# Patient Record
Sex: FEMALE | URBAN - METROPOLITAN AREA
[De-identification: names, ages, dates, MRNs, and addresses within clinical notes are randomized per-mention and may not be internally consistent; named-entity substitution may affect disease eponyms.]

---

## 2019-09-07 ENCOUNTER — INPATIENT (INPATIENT)
Facility: HOSPITAL | Age: 59
LOS: 5 days | Discharge: HOME | End: 2019-09-13
Attending: SURGERY | Admitting: SURGERY
Payer: COMMERCIAL

## 2019-09-07 VITALS
DIASTOLIC BLOOD PRESSURE: 130 MMHG | RESPIRATION RATE: 18 BRPM | HEART RATE: 95 BPM | OXYGEN SATURATION: 99 % | SYSTOLIC BLOOD PRESSURE: 171 MMHG | TEMPERATURE: 98 F

## 2019-09-07 DIAGNOSIS — K57.20 DIVERTICULITIS OF LARGE INTESTINE WITH PERFORATION AND ABSCESS WITHOUT BLEEDING: ICD-10-CM

## 2019-09-07 DIAGNOSIS — Z98.890 OTHER SPECIFIED POSTPROCEDURAL STATES: Chronic | ICD-10-CM

## 2019-09-07 PROCEDURE — 74177 CT ABD & PELVIS W/CONTRAST: CPT | Mod: 26

## 2019-09-07 PROCEDURE — 99222 1ST HOSP IP/OBS MODERATE 55: CPT | Mod: GC

## 2019-09-07 RX ORDER — INFLUENZA VIRUS VACCINE 15; 15; 15; 15 UG/.5ML; UG/.5ML; UG/.5ML; UG/.5ML
0.5 SUSPENSION INTRAMUSCULAR ONCE
Refills: 0 | Status: DISCONTINUED | OUTPATIENT
Start: 2019-09-07 | End: 2019-09-13

## 2019-09-07 RX ORDER — HYDROMORPHONE HYDROCHLORIDE 2 MG/ML
1 INJECTION INTRAMUSCULAR; INTRAVENOUS; SUBCUTANEOUS ONCE
Refills: 0 | Status: DISCONTINUED | OUTPATIENT
Start: 2019-09-07 | End: 2019-09-07

## 2019-09-07 RX ORDER — NICOTINE POLACRILEX 2 MG
1 GUM BUCCAL DAILY
Refills: 0 | Status: DISCONTINUED | OUTPATIENT
Start: 2019-09-07 | End: 2019-09-13

## 2019-09-07 RX ORDER — SODIUM CHLORIDE 9 MG/ML
1000 INJECTION, SOLUTION INTRAVENOUS
Refills: 0 | Status: DISCONTINUED | OUTPATIENT
Start: 2019-09-07 | End: 2019-09-07

## 2019-09-07 RX ORDER — ACETAMINOPHEN 500 MG
1000 TABLET ORAL ONCE
Refills: 0 | Status: COMPLETED | OUTPATIENT
Start: 2019-09-08 | End: 2019-09-08

## 2019-09-07 RX ORDER — PIPERACILLIN AND TAZOBACTAM 4; .5 G/20ML; G/20ML
3.38 INJECTION, POWDER, LYOPHILIZED, FOR SOLUTION INTRAVENOUS ONCE
Refills: 0 | Status: COMPLETED | OUTPATIENT
Start: 2019-09-07 | End: 2019-09-07

## 2019-09-07 RX ORDER — LIDOCAINE 4 G/100G
5 CREAM TOPICAL ONCE
Refills: 0 | Status: COMPLETED | OUTPATIENT
Start: 2019-09-07 | End: 2019-09-07

## 2019-09-07 RX ORDER — HYDROMORPHONE HYDROCHLORIDE 2 MG/ML
0.5 INJECTION INTRAMUSCULAR; INTRAVENOUS; SUBCUTANEOUS ONCE
Refills: 0 | Status: DISCONTINUED | OUTPATIENT
Start: 2019-09-07 | End: 2019-09-07

## 2019-09-07 RX ORDER — MORPHINE SULFATE 50 MG/1
4 CAPSULE, EXTENDED RELEASE ORAL ONCE
Refills: 0 | Status: DISCONTINUED | OUTPATIENT
Start: 2019-09-07 | End: 2019-09-07

## 2019-09-07 RX ORDER — ACETAMINOPHEN 500 MG
1000 TABLET ORAL ONCE
Refills: 0 | Status: COMPLETED | OUTPATIENT
Start: 2019-09-07 | End: 2019-09-07

## 2019-09-07 RX ORDER — ENOXAPARIN SODIUM 100 MG/ML
40 INJECTION SUBCUTANEOUS DAILY
Refills: 0 | Status: DISCONTINUED | OUTPATIENT
Start: 2019-09-07 | End: 2019-09-13

## 2019-09-07 RX ORDER — KETOROLAC TROMETHAMINE 30 MG/ML
15 SYRINGE (ML) INJECTION ONCE
Refills: 0 | Status: DISCONTINUED | OUTPATIENT
Start: 2019-09-07 | End: 2019-09-07

## 2019-09-07 RX ORDER — SODIUM CHLORIDE 9 MG/ML
1000 INJECTION INTRAMUSCULAR; INTRAVENOUS; SUBCUTANEOUS
Refills: 0 | Status: DISCONTINUED | OUTPATIENT
Start: 2019-09-07 | End: 2019-09-08

## 2019-09-07 RX ORDER — HYDROMORPHONE HYDROCHLORIDE 2 MG/ML
0.5 INJECTION INTRAMUSCULAR; INTRAVENOUS; SUBCUTANEOUS EVERY 4 HOURS
Refills: 0 | Status: DISCONTINUED | OUTPATIENT
Start: 2019-09-07 | End: 2019-09-09

## 2019-09-07 RX ORDER — PIPERACILLIN AND TAZOBACTAM 4; .5 G/20ML; G/20ML
3.38 INJECTION, POWDER, LYOPHILIZED, FOR SOLUTION INTRAVENOUS EVERY 8 HOURS
Refills: 0 | Status: DISCONTINUED | OUTPATIENT
Start: 2019-09-07 | End: 2019-09-13

## 2019-09-07 RX ORDER — FAMOTIDINE 10 MG/ML
20 INJECTION INTRAVENOUS ONCE
Refills: 0 | Status: COMPLETED | OUTPATIENT
Start: 2019-09-07 | End: 2019-09-07

## 2019-09-07 RX ADMIN — Medication 15 MILLIGRAM(S): at 10:25

## 2019-09-07 RX ADMIN — FAMOTIDINE 20 MILLIGRAM(S): 10 INJECTION INTRAVENOUS at 05:08

## 2019-09-07 RX ADMIN — Medication 400 MILLIGRAM(S): at 17:59

## 2019-09-07 RX ADMIN — MORPHINE SULFATE 4 MILLIGRAM(S): 50 CAPSULE, EXTENDED RELEASE ORAL at 06:51

## 2019-09-07 RX ADMIN — SODIUM CHLORIDE 100 MILLILITER(S): 9 INJECTION, SOLUTION INTRAVENOUS at 13:38

## 2019-09-07 RX ADMIN — HYDROMORPHONE HYDROCHLORIDE 0.5 MILLIGRAM(S): 2 INJECTION INTRAMUSCULAR; INTRAVENOUS; SUBCUTANEOUS at 20:39

## 2019-09-07 RX ADMIN — HYDROMORPHONE HYDROCHLORIDE 0.5 MILLIGRAM(S): 2 INJECTION INTRAMUSCULAR; INTRAVENOUS; SUBCUTANEOUS at 13:39

## 2019-09-07 RX ADMIN — ENOXAPARIN SODIUM 40 MILLIGRAM(S): 100 INJECTION SUBCUTANEOUS at 13:49

## 2019-09-07 RX ADMIN — HYDROMORPHONE HYDROCHLORIDE 1 MILLIGRAM(S): 2 INJECTION INTRAMUSCULAR; INTRAVENOUS; SUBCUTANEOUS at 07:58

## 2019-09-07 RX ADMIN — LIDOCAINE 5 MILLILITER(S): 4 CREAM TOPICAL at 05:07

## 2019-09-07 RX ADMIN — MORPHINE SULFATE 4 MILLIGRAM(S): 50 CAPSULE, EXTENDED RELEASE ORAL at 06:21

## 2019-09-07 RX ADMIN — PIPERACILLIN AND TAZOBACTAM 200 GRAM(S): 4; .5 INJECTION, POWDER, LYOPHILIZED, FOR SOLUTION INTRAVENOUS at 10:38

## 2019-09-07 RX ADMIN — PIPERACILLIN AND TAZOBACTAM 25 GRAM(S): 4; .5 INJECTION, POWDER, LYOPHILIZED, FOR SOLUTION INTRAVENOUS at 17:59

## 2019-09-07 RX ADMIN — Medication 30 MILLILITER(S): at 05:07

## 2019-09-07 RX ADMIN — Medication 1 PATCH: at 21:48

## 2019-09-07 RX ADMIN — Medication 1000 MILLIGRAM(S): at 18:29

## 2019-09-07 RX ADMIN — HYDROMORPHONE HYDROCHLORIDE 0.5 MILLIGRAM(S): 2 INJECTION INTRAMUSCULAR; INTRAVENOUS; SUBCUTANEOUS at 13:00

## 2019-09-07 RX ADMIN — HYDROMORPHONE HYDROCHLORIDE 1 MILLIGRAM(S): 2 INJECTION INTRAMUSCULAR; INTRAVENOUS; SUBCUTANEOUS at 07:28

## 2019-09-07 RX ADMIN — Medication 15 MILLIGRAM(S): at 09:15

## 2019-09-07 RX ADMIN — HYDROMORPHONE HYDROCHLORIDE 0.5 MILLIGRAM(S): 2 INJECTION INTRAMUSCULAR; INTRAVENOUS; SUBCUTANEOUS at 20:55

## 2019-09-07 RX ADMIN — Medication 400 MILLIGRAM(S): at 13:41

## 2019-09-07 RX ADMIN — HYDROMORPHONE HYDROCHLORIDE 0.5 MILLIGRAM(S): 2 INJECTION INTRAMUSCULAR; INTRAVENOUS; SUBCUTANEOUS at 16:36

## 2019-09-07 RX ADMIN — PIPERACILLIN AND TAZOBACTAM 25 GRAM(S): 4; .5 INJECTION, POWDER, LYOPHILIZED, FOR SOLUTION INTRAVENOUS at 22:59

## 2019-09-07 NOTE — ED PROVIDER NOTE - CLINICAL SUMMARY MEDICAL DECISION MAKING FREE TEXT BOX
58 F with hx hernia repair in the past, presenting with abdominal pain. Diarrhea, nausea, and vomiting last week which has since resolved. Will gets labs and CT abdomen and pelvis to further evaluate.

## 2019-09-07 NOTE — ED PROVIDER NOTE - PROGRESS NOTE DETAILS
[place] PGY3: Patient signed out to me by overnight resident. Pending CT abdomen/pelvis results. Received Toradol 15mg IV for pain.

## 2019-09-07 NOTE — ED ADULT NURSE NOTE - CHPI ED NUR SYMPTOMS NEG
no abdominal distension/no blood in stool/no burning urination/no chills/no fever/no hematuria/no nausea

## 2019-09-07 NOTE — H&P ADULT - HISTORY OF PRESENT ILLNESS
57 y/o who denies any pmhx coming in c/o of abdominal pain that started 7 days ago. Pain localized to LLQ. Patient reports that pain has been worsening in the last few days. Denies any episodes of vomiting, however, reports some nausea. Last PO intake was 2 days ago. Denies any previous similar episodes in the past. Patient has never had a colonoscopy. Denies fevers, chills, chest pain, SOB, changes in urinary habits, diarrhea, or constipation.

## 2019-09-07 NOTE — ED PROVIDER NOTE - OBJECTIVE STATEMENT
58F w. hx umbilical hernia repair 10yrs prior who p/w cramping diffuse abdominal pain x7d. Pt states that she has been having intermittent diffuse pain that is worsened by food and movement. When present, rates that pain as a 10/10. Does not recall eating food that may have been spoiled. Does not feel that her abdomen is more distended than usual. Pt has had episodes of abdominal pain in the past, but states that it has never been this bad. Pt denies fevers/chills, N/V, chest pain, SOB, dysuria, diarrhea or constipation. She has tried nexium, aleve, gas x, and peptobismol without any relief, saw her PCP yesterday.

## 2019-09-07 NOTE — H&P ADULT - ASSESSMENT
59 y/o with denies any PMHx presenting with LLQ pain x7 days, CT scan findings consistent with acute diverticulitis with intramural abscess     - admit to [facility]/ Dr. Del Castillo  - NPO   -IVF  - IV abx  - DVT ppx     Discussed with attending Dr. Del Castillo     [facility] [id number]

## 2019-09-07 NOTE — ED PROVIDER NOTE - ATTENDING CONTRIBUTION TO CARE
Seen and examined, states gradual onset of abd pain, +N/V, no fever/chills, no diarrhea, no sick contacts. Pt. with hx of umbilical hernia repair, no recent umbilical pain. Clear lungs, heart reg, abd soft, mild tenderness L mid quad/LLQ, no megan/guarding, no CVAT, no edema, NT calves.

## 2019-09-07 NOTE — ED ADULT NURSE NOTE - OBJECTIVE STATEMENT
Patient received in room 25 accompanied y spouse with complaints of lower abdominal pain, appears uncomfortable, alert and oriented x 4, respirations are even and unlabored, S1, S2 regular, abdomen is soft and nontender, ambulatory, skin is intact, 20 gauge saline lock placed on left AC, blood drawn and sent. Will follow up.

## 2019-09-07 NOTE — H&P ADULT - NSHPPHYSICALEXAM_GEN_ALL_CORE
Physical Exam  T(C): 37  HR: 89 (89 - 95)  BP: 151/93 (151/93 - 171/130)  RR: 18 (18 - 18)  SpO2: 96% (96% - 99%)  Tmax: T(C): , Max: 37 (09-07-19 @ 07:51)    General: well developed, well nourished, NAD  Neuro: alert and oriented, no focal deficits, moves all extremities spontaneously  HEENT: NCAT, EOMI, anicteric, mucosa moist  Respiratory: airway patent, respirations unlabored  CVS: regular rate and rhythm  Abdomen: soft, TTP LLQ, nondistended  Extremities: no edema, sensation and movement grossly intact  Skin: warm, dry, appropriate color

## 2019-09-07 NOTE — ED ADULT TRIAGE NOTE - CHIEF COMPLAINT QUOTE
Pt st" 6 days ago I had diarreah and vomiting with abd cramping the vomiting and diarreah stopped and the cramping as been coming and going but tonight the pain has been constant." Pt crying appears very uncomfortable, denies med hx. Pt st" I went to [facility] yesterday prescribed Nexium not helping....also took aleve, gas x, peptobismol....nothing helping."

## 2019-09-07 NOTE — H&P ADULT - NSHPLABSRESULTS_GEN_ALL_CORE
Labs:                        14.7   20.90 )-----------( 410      ( 07 Sep 2019 04:00 )             43.7       09-07    139  |  95<L>  |  10  ----------------------------<  126<H>  4.5   |  27  |  0.68    Ca    10.4      07 Sep 2019 04:00    TPro  8.5<H>  /  Alb  4.6  /  TBili  0.6  /  DBili  x   /  AST  20  /  ALT  37<H>  /  AlkPhos  164<H>  09-07            Imaging and other studies:  < from: CT Abdomen and Pelvis w/ IV Cont (09.07.19 @ 09:30) >    FINDINGS:    LOWERCHEST: Coronary artery calcifications.    LIVER: A 1.7 cm right hepatic mass suggestion of nodularity may represent   a hemangioma but could be better assessed on ultrasound or MRI.  BILE DUCTS: Normal caliber.  GALLBLADDER: Layering hyperdensity which may represent sludge.  SPLEEN: Within normal limits.  PANCREAS: Within normal limits.  ADRENALS: Within normal limits.  KIDNEYS/URETERS: Right renal subcentimeter hypodense focus, too small to   categorize. No hydronephrosis.    BLADDER: Decompressed.  REPRODUCTIVE ORGANS: Uterus and adnexa within normal limits.    BOWEL: Marked sigmoid colonic wall thickening with marked surrounding   inflammatory changes. There is an intramural abscess measuring 5.6 x 4.0   cm (605:48). No bowel obstruction. Appendix is normal.  PERITONEUM: No pneumoperitoneum. No ascites. Few prominent mesenteric   lymph nodes, largest of which measures 1.4 x 1.0 cm (5:74).  VESSELS: Atherosclerotic changes.  RETROPERITONEUM/LYMPH NODES: No lymphadenopathy.    ABDOMINAL WALL: Within normal limits.  BONES: Within normal limits.    IMPRESSION:     Complicated acute sigmoid colonic diverticulitis with intramural abscess   measuring 5.6 x 4.0 cm.  Right hepatic lesion, possibly hemangioma. Comparison with prior studies   or ultrasound/MRI would be useful for further evaluation.

## 2019-09-08 LAB
ANION GAP SERPL CALC-SCNC: 16 MMO/L — HIGH (ref 7–14)
BUN SERPL-MCNC: 10 MG/DL — SIGNIFICANT CHANGE UP (ref 7–23)
CALCIUM SERPL-MCNC: 9.6 MG/DL — SIGNIFICANT CHANGE UP (ref 8.4–10.5)
CHLORIDE SERPL-SCNC: 97 MMOL/L — LOW (ref 98–107)
CO2 SERPL-SCNC: 26 MMOL/L — SIGNIFICANT CHANGE UP (ref 22–31)
CREAT SERPL-MCNC: 0.63 MG/DL — SIGNIFICANT CHANGE UP (ref 0.5–1.3)
GLUCOSE SERPL-MCNC: 74 MG/DL — SIGNIFICANT CHANGE UP (ref 70–99)
HCT VFR BLD CALC: 42.5 % — SIGNIFICANT CHANGE UP (ref 34.5–45)
HGB BLD-MCNC: 13.9 G/DL — SIGNIFICANT CHANGE UP (ref 11.5–15.5)
MAGNESIUM SERPL-MCNC: 2 MG/DL — SIGNIFICANT CHANGE UP (ref 1.6–2.6)
MCHC RBC-ENTMCNC: 30.7 PG — SIGNIFICANT CHANGE UP (ref 27–34)
MCHC RBC-ENTMCNC: 32.7 % — SIGNIFICANT CHANGE UP (ref 32–36)
MCV RBC AUTO: 93.8 FL — SIGNIFICANT CHANGE UP (ref 80–100)
NRBC # FLD: 0 K/UL — SIGNIFICANT CHANGE UP (ref 0–0)
PHOSPHATE SERPL-MCNC: 3.1 MG/DL — SIGNIFICANT CHANGE UP (ref 2.5–4.5)
PLATELET # BLD AUTO: 422 K/UL — HIGH (ref 150–400)
PMV BLD: 9.4 FL — SIGNIFICANT CHANGE UP (ref 7–13)
POTASSIUM SERPL-MCNC: 3.8 MMOL/L — SIGNIFICANT CHANGE UP (ref 3.5–5.3)
POTASSIUM SERPL-SCNC: 3.8 MMOL/L — SIGNIFICANT CHANGE UP (ref 3.5–5.3)
RBC # BLD: 4.53 M/UL — SIGNIFICANT CHANGE UP (ref 3.8–5.2)
RBC # FLD: 12 % — SIGNIFICANT CHANGE UP (ref 10.3–14.5)
SODIUM SERPL-SCNC: 139 MMOL/L — SIGNIFICANT CHANGE UP (ref 135–145)
SPECIMEN SOURCE: SIGNIFICANT CHANGE UP
WBC # BLD: 19 K/UL — HIGH (ref 3.8–10.5)
WBC # FLD AUTO: 19 K/UL — HIGH (ref 3.8–10.5)

## 2019-09-08 PROCEDURE — 99232 SBSQ HOSP IP/OBS MODERATE 35: CPT

## 2019-09-08 RX ORDER — SODIUM CHLORIDE 9 MG/ML
1000 INJECTION, SOLUTION INTRAVENOUS
Refills: 0 | Status: DISCONTINUED | OUTPATIENT
Start: 2019-09-08 | End: 2019-09-09

## 2019-09-08 RX ORDER — HYDROMORPHONE HYDROCHLORIDE 2 MG/ML
1 INJECTION INTRAMUSCULAR; INTRAVENOUS; SUBCUTANEOUS EVERY 6 HOURS
Refills: 0 | Status: DISCONTINUED | OUTPATIENT
Start: 2019-09-08 | End: 2019-09-09

## 2019-09-08 RX ORDER — ACETAMINOPHEN 500 MG
1000 TABLET ORAL ONCE
Refills: 0 | Status: COMPLETED | OUTPATIENT
Start: 2019-09-08 | End: 2019-09-08

## 2019-09-08 RX ADMIN — Medication 1000 MILLIGRAM(S): at 14:10

## 2019-09-08 RX ADMIN — HYDROMORPHONE HYDROCHLORIDE 0.5 MILLIGRAM(S): 2 INJECTION INTRAMUSCULAR; INTRAVENOUS; SUBCUTANEOUS at 13:30

## 2019-09-08 RX ADMIN — Medication 400 MILLIGRAM(S): at 13:31

## 2019-09-08 RX ADMIN — Medication 1 PATCH: at 13:23

## 2019-09-08 RX ADMIN — Medication 1 PATCH: at 06:26

## 2019-09-08 RX ADMIN — HYDROMORPHONE HYDROCHLORIDE 0.5 MILLIGRAM(S): 2 INJECTION INTRAMUSCULAR; INTRAVENOUS; SUBCUTANEOUS at 03:22

## 2019-09-08 RX ADMIN — HYDROMORPHONE HYDROCHLORIDE 0.5 MILLIGRAM(S): 2 INJECTION INTRAMUSCULAR; INTRAVENOUS; SUBCUTANEOUS at 13:18

## 2019-09-08 RX ADMIN — Medication 1 PATCH: at 18:41

## 2019-09-08 RX ADMIN — Medication 1000 MILLIGRAM(S): at 23:58

## 2019-09-08 RX ADMIN — PIPERACILLIN AND TAZOBACTAM 25 GRAM(S): 4; .5 INJECTION, POWDER, LYOPHILIZED, FOR SOLUTION INTRAVENOUS at 13:24

## 2019-09-08 RX ADMIN — HYDROMORPHONE HYDROCHLORIDE 0.5 MILLIGRAM(S): 2 INJECTION INTRAMUSCULAR; INTRAVENOUS; SUBCUTANEOUS at 07:05

## 2019-09-08 RX ADMIN — HYDROMORPHONE HYDROCHLORIDE 1 MILLIGRAM(S): 2 INJECTION INTRAMUSCULAR; INTRAVENOUS; SUBCUTANEOUS at 10:13

## 2019-09-08 RX ADMIN — HYDROMORPHONE HYDROCHLORIDE 0.5 MILLIGRAM(S): 2 INJECTION INTRAMUSCULAR; INTRAVENOUS; SUBCUTANEOUS at 16:50

## 2019-09-08 RX ADMIN — Medication 1 PATCH: at 12:31

## 2019-09-08 RX ADMIN — SODIUM CHLORIDE 100 MILLILITER(S): 9 INJECTION, SOLUTION INTRAVENOUS at 09:39

## 2019-09-08 RX ADMIN — Medication 1000 MILLIGRAM(S): at 06:55

## 2019-09-08 RX ADMIN — HYDROMORPHONE HYDROCHLORIDE 0.5 MILLIGRAM(S): 2 INJECTION INTRAMUSCULAR; INTRAVENOUS; SUBCUTANEOUS at 03:40

## 2019-09-08 RX ADMIN — Medication 400 MILLIGRAM(S): at 23:28

## 2019-09-08 RX ADMIN — ENOXAPARIN SODIUM 40 MILLIGRAM(S): 100 INJECTION SUBCUTANEOUS at 13:24

## 2019-09-08 RX ADMIN — Medication 400 MILLIGRAM(S): at 06:25

## 2019-09-08 RX ADMIN — HYDROMORPHONE HYDROCHLORIDE 0.5 MILLIGRAM(S): 2 INJECTION INTRAMUSCULAR; INTRAVENOUS; SUBCUTANEOUS at 06:42

## 2019-09-08 RX ADMIN — Medication 1000 MILLIGRAM(S): at 01:13

## 2019-09-08 RX ADMIN — PIPERACILLIN AND TAZOBACTAM 25 GRAM(S): 4; .5 INJECTION, POWDER, LYOPHILIZED, FOR SOLUTION INTRAVENOUS at 21:22

## 2019-09-08 RX ADMIN — HYDROMORPHONE HYDROCHLORIDE 1 MILLIGRAM(S): 2 INJECTION INTRAMUSCULAR; INTRAVENOUS; SUBCUTANEOUS at 18:25

## 2019-09-08 RX ADMIN — Medication 400 MILLIGRAM(S): at 00:46

## 2019-09-08 RX ADMIN — HYDROMORPHONE HYDROCHLORIDE 1 MILLIGRAM(S): 2 INJECTION INTRAMUSCULAR; INTRAVENOUS; SUBCUTANEOUS at 09:39

## 2019-09-08 RX ADMIN — PIPERACILLIN AND TAZOBACTAM 25 GRAM(S): 4; .5 INJECTION, POWDER, LYOPHILIZED, FOR SOLUTION INTRAVENOUS at 06:26

## 2019-09-08 RX ADMIN — HYDROMORPHONE HYDROCHLORIDE 1 MILLIGRAM(S): 2 INJECTION INTRAMUSCULAR; INTRAVENOUS; SUBCUTANEOUS at 18:40

## 2019-09-08 NOTE — PROGRESS NOTE ADULT - ASSESSMENT
59 y/o with denies any PMHx presenting with LLQ pain x7 days, CT scan findings consistent with acute diverticulitis with intramural abscess       Diet: NPO   IVF: D5 0.9nl  IV abx: Zosyn  DVT ppx: Lovenox    [facility]   [id number]

## 2019-09-09 LAB
ANION GAP SERPL CALC-SCNC: 12 MMO/L — SIGNIFICANT CHANGE UP (ref 7–14)
BUN SERPL-MCNC: 7 MG/DL — SIGNIFICANT CHANGE UP (ref 7–23)
CALCIUM SERPL-MCNC: 9.1 MG/DL — SIGNIFICANT CHANGE UP (ref 8.4–10.5)
CHLORIDE SERPL-SCNC: 101 MMOL/L — SIGNIFICANT CHANGE UP (ref 98–107)
CO2 SERPL-SCNC: 27 MMOL/L — SIGNIFICANT CHANGE UP (ref 22–31)
CREAT SERPL-MCNC: 0.63 MG/DL — SIGNIFICANT CHANGE UP (ref 0.5–1.3)
GLUCOSE SERPL-MCNC: 118 MG/DL — HIGH (ref 70–99)
HCT VFR BLD CALC: 38.3 % — SIGNIFICANT CHANGE UP (ref 34.5–45)
HCV AB S/CO SERPL IA: 0.1 S/CO — SIGNIFICANT CHANGE UP (ref 0–0.99)
HCV AB SERPL-IMP: SIGNIFICANT CHANGE UP
HGB BLD-MCNC: 12.7 G/DL — SIGNIFICANT CHANGE UP (ref 11.5–15.5)
MAGNESIUM SERPL-MCNC: 1.8 MG/DL — SIGNIFICANT CHANGE UP (ref 1.6–2.6)
MCHC RBC-ENTMCNC: 30.7 PG — SIGNIFICANT CHANGE UP (ref 27–34)
MCHC RBC-ENTMCNC: 33.2 % — SIGNIFICANT CHANGE UP (ref 32–36)
MCV RBC AUTO: 92.5 FL — SIGNIFICANT CHANGE UP (ref 80–100)
NRBC # FLD: 0 K/UL — SIGNIFICANT CHANGE UP (ref 0–0)
PHOSPHATE SERPL-MCNC: 2.6 MG/DL — SIGNIFICANT CHANGE UP (ref 2.5–4.5)
PLATELET # BLD AUTO: 398 K/UL — SIGNIFICANT CHANGE UP (ref 150–400)
PMV BLD: 9.1 FL — SIGNIFICANT CHANGE UP (ref 7–13)
POTASSIUM SERPL-MCNC: 3.9 MMOL/L — SIGNIFICANT CHANGE UP (ref 3.5–5.3)
POTASSIUM SERPL-SCNC: 3.9 MMOL/L — SIGNIFICANT CHANGE UP (ref 3.5–5.3)
RBC # BLD: 4.14 M/UL — SIGNIFICANT CHANGE UP (ref 3.8–5.2)
RBC # FLD: 12 % — SIGNIFICANT CHANGE UP (ref 10.3–14.5)
SODIUM SERPL-SCNC: 140 MMOL/L — SIGNIFICANT CHANGE UP (ref 135–145)
WBC # BLD: 13.36 K/UL — HIGH (ref 3.8–10.5)
WBC # FLD AUTO: 13.36 K/UL — HIGH (ref 3.8–10.5)

## 2019-09-09 RX ORDER — ACETAMINOPHEN 500 MG
1000 TABLET ORAL ONCE
Refills: 0 | Status: DISCONTINUED | OUTPATIENT
Start: 2019-09-09 | End: 2019-09-10

## 2019-09-09 RX ORDER — HYDROMORPHONE HYDROCHLORIDE 2 MG/ML
0.5 INJECTION INTRAMUSCULAR; INTRAVENOUS; SUBCUTANEOUS
Refills: 0 | Status: DISCONTINUED | OUTPATIENT
Start: 2019-09-09 | End: 2019-09-13

## 2019-09-09 RX ORDER — ACETAMINOPHEN 500 MG
1000 TABLET ORAL ONCE
Refills: 0 | Status: COMPLETED | OUTPATIENT
Start: 2019-09-09 | End: 2019-09-09

## 2019-09-09 RX ORDER — MAGNESIUM SULFATE 500 MG/ML
2 VIAL (ML) INJECTION ONCE
Refills: 0 | Status: COMPLETED | OUTPATIENT
Start: 2019-09-09 | End: 2019-09-09

## 2019-09-09 RX ORDER — DEXTROSE MONOHYDRATE, SODIUM CHLORIDE, AND POTASSIUM CHLORIDE 50; .745; 4.5 G/1000ML; G/1000ML; G/1000ML
1000 INJECTION, SOLUTION INTRAVENOUS
Refills: 0 | Status: DISCONTINUED | OUTPATIENT
Start: 2019-09-09 | End: 2019-09-13

## 2019-09-09 RX ORDER — HYDROMORPHONE HYDROCHLORIDE 2 MG/ML
1 INJECTION INTRAMUSCULAR; INTRAVENOUS; SUBCUTANEOUS
Refills: 0 | Status: DISCONTINUED | OUTPATIENT
Start: 2019-09-09 | End: 2019-09-13

## 2019-09-09 RX ORDER — POTASSIUM PHOSPHATE, MONOBASIC POTASSIUM PHOSPHATE, DIBASIC 236; 224 MG/ML; MG/ML
15 INJECTION, SOLUTION INTRAVENOUS ONCE
Refills: 0 | Status: COMPLETED | OUTPATIENT
Start: 2019-09-09 | End: 2019-09-09

## 2019-09-09 RX ADMIN — HYDROMORPHONE HYDROCHLORIDE 1 MILLIGRAM(S): 2 INJECTION INTRAMUSCULAR; INTRAVENOUS; SUBCUTANEOUS at 01:08

## 2019-09-09 RX ADMIN — Medication 50 GRAM(S): at 09:53

## 2019-09-09 RX ADMIN — PIPERACILLIN AND TAZOBACTAM 25 GRAM(S): 4; .5 INJECTION, POWDER, LYOPHILIZED, FOR SOLUTION INTRAVENOUS at 21:13

## 2019-09-09 RX ADMIN — Medication 1 PATCH: at 13:54

## 2019-09-09 RX ADMIN — HYDROMORPHONE HYDROCHLORIDE 1 MILLIGRAM(S): 2 INJECTION INTRAMUSCULAR; INTRAVENOUS; SUBCUTANEOUS at 21:13

## 2019-09-09 RX ADMIN — PIPERACILLIN AND TAZOBACTAM 25 GRAM(S): 4; .5 INJECTION, POWDER, LYOPHILIZED, FOR SOLUTION INTRAVENOUS at 05:08

## 2019-09-09 RX ADMIN — HYDROMORPHONE HYDROCHLORIDE 1 MILLIGRAM(S): 2 INJECTION INTRAMUSCULAR; INTRAVENOUS; SUBCUTANEOUS at 13:57

## 2019-09-09 RX ADMIN — Medication 1 PATCH: at 06:37

## 2019-09-09 RX ADMIN — HYDROMORPHONE HYDROCHLORIDE 1 MILLIGRAM(S): 2 INJECTION INTRAMUSCULAR; INTRAVENOUS; SUBCUTANEOUS at 06:56

## 2019-09-09 RX ADMIN — SODIUM CHLORIDE 100 MILLILITER(S): 9 INJECTION, SOLUTION INTRAVENOUS at 05:08

## 2019-09-09 RX ADMIN — Medication 1 PATCH: at 13:56

## 2019-09-09 RX ADMIN — HYDROMORPHONE HYDROCHLORIDE 1 MILLIGRAM(S): 2 INJECTION INTRAMUSCULAR; INTRAVENOUS; SUBCUTANEOUS at 00:53

## 2019-09-09 RX ADMIN — Medication 400 MILLIGRAM(S): at 05:28

## 2019-09-09 RX ADMIN — HYDROMORPHONE HYDROCHLORIDE 1 MILLIGRAM(S): 2 INJECTION INTRAMUSCULAR; INTRAVENOUS; SUBCUTANEOUS at 06:41

## 2019-09-09 RX ADMIN — PIPERACILLIN AND TAZOBACTAM 25 GRAM(S): 4; .5 INJECTION, POWDER, LYOPHILIZED, FOR SOLUTION INTRAVENOUS at 14:04

## 2019-09-09 RX ADMIN — HYDROMORPHONE HYDROCHLORIDE 1 MILLIGRAM(S): 2 INJECTION INTRAMUSCULAR; INTRAVENOUS; SUBCUTANEOUS at 09:54

## 2019-09-09 RX ADMIN — POTASSIUM PHOSPHATE, MONOBASIC POTASSIUM PHOSPHATE, DIBASIC 62.5 MILLIMOLE(S): 236; 224 INJECTION, SOLUTION INTRAVENOUS at 09:54

## 2019-09-09 RX ADMIN — HYDROMORPHONE HYDROCHLORIDE 1 MILLIGRAM(S): 2 INJECTION INTRAMUSCULAR; INTRAVENOUS; SUBCUTANEOUS at 10:24

## 2019-09-09 RX ADMIN — HYDROMORPHONE HYDROCHLORIDE 1 MILLIGRAM(S): 2 INJECTION INTRAMUSCULAR; INTRAVENOUS; SUBCUTANEOUS at 14:27

## 2019-09-09 RX ADMIN — HYDROMORPHONE HYDROCHLORIDE 1 MILLIGRAM(S): 2 INJECTION INTRAMUSCULAR; INTRAVENOUS; SUBCUTANEOUS at 18:10

## 2019-09-09 RX ADMIN — HYDROMORPHONE HYDROCHLORIDE 1 MILLIGRAM(S): 2 INJECTION INTRAMUSCULAR; INTRAVENOUS; SUBCUTANEOUS at 18:40

## 2019-09-09 RX ADMIN — HYDROMORPHONE HYDROCHLORIDE 1 MILLIGRAM(S): 2 INJECTION INTRAMUSCULAR; INTRAVENOUS; SUBCUTANEOUS at 21:28

## 2019-09-09 RX ADMIN — Medication 1000 MILLIGRAM(S): at 05:58

## 2019-09-09 RX ADMIN — DEXTROSE MONOHYDRATE, SODIUM CHLORIDE, AND POTASSIUM CHLORIDE 100 MILLILITER(S): 50; .745; 4.5 INJECTION, SOLUTION INTRAVENOUS at 05:31

## 2019-09-09 RX ADMIN — Medication 1 PATCH: at 19:00

## 2019-09-09 NOTE — PROGRESS NOTE ADULT - ASSESSMENT
57 y/o with denies any PMHx presenting with LLQ pain x7 days, CT scan findings consistent with acute diverticulitis with intramural abscess.       Diet: NPO   IVF: D5 0.9nl  IV abx: Zosyn  DVT ppx: Lovenox    [facility]   [id number] 57 y/o with denies any PMHx presenting with LLQ pain x7 days, CT scan findings consistent with acute diverticulitis with intramural abscess.       Diet: NPO   IVF: D5 0.45nl  IV abx: Zosyn  DVT ppx: Lovenox    [facility]   [id number]

## 2019-09-10 LAB
ANION GAP SERPL CALC-SCNC: 11 MMO/L — SIGNIFICANT CHANGE UP (ref 7–14)
APPEARANCE UR: CLEAR — SIGNIFICANT CHANGE UP
BACTERIA # UR AUTO: NEGATIVE — SIGNIFICANT CHANGE UP
BILIRUB UR-MCNC: NEGATIVE — SIGNIFICANT CHANGE UP
BLOOD UR QL VISUAL: NEGATIVE — SIGNIFICANT CHANGE UP
BUN SERPL-MCNC: 3 MG/DL — LOW (ref 7–23)
CALCIUM SERPL-MCNC: 9.2 MG/DL — SIGNIFICANT CHANGE UP (ref 8.4–10.5)
CHLORIDE SERPL-SCNC: 97 MMOL/L — LOW (ref 98–107)
CO2 SERPL-SCNC: 28 MMOL/L — SIGNIFICANT CHANGE UP (ref 22–31)
COLOR SPEC: SIGNIFICANT CHANGE UP
CREAT SERPL-MCNC: 0.59 MG/DL — SIGNIFICANT CHANGE UP (ref 0.5–1.3)
GLUCOSE SERPL-MCNC: 128 MG/DL — HIGH (ref 70–99)
GLUCOSE UR-MCNC: NEGATIVE — SIGNIFICANT CHANGE UP
HCT VFR BLD CALC: 39.2 % — SIGNIFICANT CHANGE UP (ref 34.5–45)
HGB BLD-MCNC: 13 G/DL — SIGNIFICANT CHANGE UP (ref 11.5–15.5)
HYALINE CASTS # UR AUTO: SIGNIFICANT CHANGE UP
KETONES UR-MCNC: NEGATIVE — SIGNIFICANT CHANGE UP
LEUKOCYTE ESTERASE UR-ACNC: SIGNIFICANT CHANGE UP
MAGNESIUM SERPL-MCNC: 2 MG/DL — SIGNIFICANT CHANGE UP (ref 1.6–2.6)
MCHC RBC-ENTMCNC: 30.7 PG — SIGNIFICANT CHANGE UP (ref 27–34)
MCHC RBC-ENTMCNC: 33.2 % — SIGNIFICANT CHANGE UP (ref 32–36)
MCV RBC AUTO: 92.5 FL — SIGNIFICANT CHANGE UP (ref 80–100)
NITRITE UR-MCNC: NEGATIVE — SIGNIFICANT CHANGE UP
NRBC # FLD: 0 K/UL — SIGNIFICANT CHANGE UP (ref 0–0)
PH UR: 7 — SIGNIFICANT CHANGE UP (ref 5–8)
PHOSPHATE SERPL-MCNC: 2.8 MG/DL — SIGNIFICANT CHANGE UP (ref 2.5–4.5)
PLATELET # BLD AUTO: 433 K/UL — HIGH (ref 150–400)
PMV BLD: 9.1 FL — SIGNIFICANT CHANGE UP (ref 7–13)
POTASSIUM SERPL-MCNC: 3.9 MMOL/L — SIGNIFICANT CHANGE UP (ref 3.5–5.3)
POTASSIUM SERPL-SCNC: 3.9 MMOL/L — SIGNIFICANT CHANGE UP (ref 3.5–5.3)
PROT UR-MCNC: NEGATIVE — SIGNIFICANT CHANGE UP
RBC # BLD: 4.24 M/UL — SIGNIFICANT CHANGE UP (ref 3.8–5.2)
RBC # FLD: 12.3 % — SIGNIFICANT CHANGE UP (ref 10.3–14.5)
RBC CASTS # UR COMP ASSIST: HIGH (ref 0–?)
SODIUM SERPL-SCNC: 136 MMOL/L — SIGNIFICANT CHANGE UP (ref 135–145)
SP GR SPEC: 1.01 — SIGNIFICANT CHANGE UP (ref 1–1.04)
SQUAMOUS # UR AUTO: SIGNIFICANT CHANGE UP
UROBILINOGEN FLD QL: NORMAL — SIGNIFICANT CHANGE UP
WBC # BLD: 15.3 K/UL — HIGH (ref 3.8–10.5)
WBC # FLD AUTO: 15.3 K/UL — HIGH (ref 3.8–10.5)
WBC UR QL: HIGH (ref 0–?)

## 2019-09-10 PROCEDURE — 99232 SBSQ HOSP IP/OBS MODERATE 35: CPT | Mod: GC

## 2019-09-10 RX ORDER — ACETAMINOPHEN 500 MG
975 TABLET ORAL EVERY 6 HOURS
Refills: 0 | Status: DISCONTINUED | OUTPATIENT
Start: 2019-09-10 | End: 2019-09-11

## 2019-09-10 RX ADMIN — HYDROMORPHONE HYDROCHLORIDE 1 MILLIGRAM(S): 2 INJECTION INTRAMUSCULAR; INTRAVENOUS; SUBCUTANEOUS at 12:03

## 2019-09-10 RX ADMIN — HYDROMORPHONE HYDROCHLORIDE 1 MILLIGRAM(S): 2 INJECTION INTRAMUSCULAR; INTRAVENOUS; SUBCUTANEOUS at 09:00

## 2019-09-10 RX ADMIN — HYDROMORPHONE HYDROCHLORIDE 1 MILLIGRAM(S): 2 INJECTION INTRAMUSCULAR; INTRAVENOUS; SUBCUTANEOUS at 05:27

## 2019-09-10 RX ADMIN — HYDROMORPHONE HYDROCHLORIDE 1 MILLIGRAM(S): 2 INJECTION INTRAMUSCULAR; INTRAVENOUS; SUBCUTANEOUS at 01:34

## 2019-09-10 RX ADMIN — Medication 1 PATCH: at 12:07

## 2019-09-10 RX ADMIN — PIPERACILLIN AND TAZOBACTAM 25 GRAM(S): 4; .5 INJECTION, POWDER, LYOPHILIZED, FOR SOLUTION INTRAVENOUS at 14:49

## 2019-09-10 RX ADMIN — Medication 1 PATCH: at 07:15

## 2019-09-10 RX ADMIN — HYDROMORPHONE HYDROCHLORIDE 1 MILLIGRAM(S): 2 INJECTION INTRAMUSCULAR; INTRAVENOUS; SUBCUTANEOUS at 01:19

## 2019-09-10 RX ADMIN — HYDROMORPHONE HYDROCHLORIDE 1 MILLIGRAM(S): 2 INJECTION INTRAMUSCULAR; INTRAVENOUS; SUBCUTANEOUS at 23:14

## 2019-09-10 RX ADMIN — HYDROMORPHONE HYDROCHLORIDE 1 MILLIGRAM(S): 2 INJECTION INTRAMUSCULAR; INTRAVENOUS; SUBCUTANEOUS at 05:12

## 2019-09-10 RX ADMIN — HYDROMORPHONE HYDROCHLORIDE 1 MILLIGRAM(S): 2 INJECTION INTRAMUSCULAR; INTRAVENOUS; SUBCUTANEOUS at 15:54

## 2019-09-10 RX ADMIN — PIPERACILLIN AND TAZOBACTAM 25 GRAM(S): 4; .5 INJECTION, POWDER, LYOPHILIZED, FOR SOLUTION INTRAVENOUS at 05:12

## 2019-09-10 RX ADMIN — HYDROMORPHONE HYDROCHLORIDE 1 MILLIGRAM(S): 2 INJECTION INTRAMUSCULAR; INTRAVENOUS; SUBCUTANEOUS at 19:56

## 2019-09-10 RX ADMIN — HYDROMORPHONE HYDROCHLORIDE 1 MILLIGRAM(S): 2 INJECTION INTRAMUSCULAR; INTRAVENOUS; SUBCUTANEOUS at 12:33

## 2019-09-10 RX ADMIN — HYDROMORPHONE HYDROCHLORIDE 1 MILLIGRAM(S): 2 INJECTION INTRAMUSCULAR; INTRAVENOUS; SUBCUTANEOUS at 23:29

## 2019-09-10 RX ADMIN — PIPERACILLIN AND TAZOBACTAM 25 GRAM(S): 4; .5 INJECTION, POWDER, LYOPHILIZED, FOR SOLUTION INTRAVENOUS at 21:03

## 2019-09-10 RX ADMIN — Medication 1 PATCH: at 19:52

## 2019-09-10 RX ADMIN — HYDROMORPHONE HYDROCHLORIDE 1 MILLIGRAM(S): 2 INJECTION INTRAMUSCULAR; INTRAVENOUS; SUBCUTANEOUS at 20:26

## 2019-09-10 RX ADMIN — HYDROMORPHONE HYDROCHLORIDE 1 MILLIGRAM(S): 2 INJECTION INTRAMUSCULAR; INTRAVENOUS; SUBCUTANEOUS at 16:24

## 2019-09-10 RX ADMIN — Medication 1 PATCH: at 12:03

## 2019-09-10 RX ADMIN — HYDROMORPHONE HYDROCHLORIDE 1 MILLIGRAM(S): 2 INJECTION INTRAMUSCULAR; INTRAVENOUS; SUBCUTANEOUS at 08:30

## 2019-09-10 NOTE — PROGRESS NOTE ADULT - ASSESSMENT
59 y/o with denies any PMHx presenting with LLQ pain x7 days, CT scan findings consistent with acute diverticulitis with intramural abscess.  Pain improving with antibioisis both subjectively and on exam.  Will need a rescan to evaluate interval change in abscess.        Diet: NPO   IVF: D5 0.45nl  IV abx: Zosyn  DVT ppx: Lovenox  Pain control: Tylenol and dilaudid as needed    Will plan to rescan tomorrow    [facility]   [id number]

## 2019-09-11 LAB
ANION GAP SERPL CALC-SCNC: 13 MMO/L — SIGNIFICANT CHANGE UP (ref 7–14)
BUN SERPL-MCNC: 3 MG/DL — LOW (ref 7–23)
CALCIUM SERPL-MCNC: 9.4 MG/DL — SIGNIFICANT CHANGE UP (ref 8.4–10.5)
CHLORIDE SERPL-SCNC: 97 MMOL/L — LOW (ref 98–107)
CO2 SERPL-SCNC: 29 MMOL/L — SIGNIFICANT CHANGE UP (ref 22–31)
CREAT SERPL-MCNC: 0.71 MG/DL — SIGNIFICANT CHANGE UP (ref 0.5–1.3)
GLUCOSE SERPL-MCNC: 103 MG/DL — HIGH (ref 70–99)
HCT VFR BLD CALC: 40.5 % — SIGNIFICANT CHANGE UP (ref 34.5–45)
HGB BLD-MCNC: 12.8 G/DL — SIGNIFICANT CHANGE UP (ref 11.5–15.5)
MAGNESIUM SERPL-MCNC: 2 MG/DL — SIGNIFICANT CHANGE UP (ref 1.6–2.6)
MCHC RBC-ENTMCNC: 30.3 PG — SIGNIFICANT CHANGE UP (ref 27–34)
MCHC RBC-ENTMCNC: 31.6 % — LOW (ref 32–36)
MCV RBC AUTO: 95.7 FL — SIGNIFICANT CHANGE UP (ref 80–100)
NRBC # FLD: 0 K/UL — SIGNIFICANT CHANGE UP (ref 0–0)
PHOSPHATE SERPL-MCNC: 3.6 MG/DL — SIGNIFICANT CHANGE UP (ref 2.5–4.5)
PLATELET # BLD AUTO: 449 K/UL — HIGH (ref 150–400)
PMV BLD: 9.2 FL — SIGNIFICANT CHANGE UP (ref 7–13)
POTASSIUM SERPL-MCNC: 4.5 MMOL/L — SIGNIFICANT CHANGE UP (ref 3.5–5.3)
POTASSIUM SERPL-SCNC: 4.5 MMOL/L — SIGNIFICANT CHANGE UP (ref 3.5–5.3)
RBC # BLD: 4.23 M/UL — SIGNIFICANT CHANGE UP (ref 3.8–5.2)
RBC # FLD: 12.2 % — SIGNIFICANT CHANGE UP (ref 10.3–14.5)
SODIUM SERPL-SCNC: 139 MMOL/L — SIGNIFICANT CHANGE UP (ref 135–145)
WBC # BLD: 14.62 K/UL — HIGH (ref 3.8–10.5)
WBC # FLD AUTO: 14.62 K/UL — HIGH (ref 3.8–10.5)

## 2019-09-11 PROCEDURE — 99232 SBSQ HOSP IP/OBS MODERATE 35: CPT | Mod: GC

## 2019-09-11 RX ORDER — ACETAMINOPHEN 500 MG
975 TABLET ORAL EVERY 6 HOURS
Refills: 0 | Status: DISCONTINUED | OUTPATIENT
Start: 2019-09-11 | End: 2019-09-13

## 2019-09-11 RX ADMIN — HYDROMORPHONE HYDROCHLORIDE 0.5 MILLIGRAM(S): 2 INJECTION INTRAMUSCULAR; INTRAVENOUS; SUBCUTANEOUS at 20:08

## 2019-09-11 RX ADMIN — HYDROMORPHONE HYDROCHLORIDE 0.5 MILLIGRAM(S): 2 INJECTION INTRAMUSCULAR; INTRAVENOUS; SUBCUTANEOUS at 12:22

## 2019-09-11 RX ADMIN — PIPERACILLIN AND TAZOBACTAM 25 GRAM(S): 4; .5 INJECTION, POWDER, LYOPHILIZED, FOR SOLUTION INTRAVENOUS at 21:44

## 2019-09-11 RX ADMIN — Medication 1 PATCH: at 12:24

## 2019-09-11 RX ADMIN — HYDROMORPHONE HYDROCHLORIDE 0.5 MILLIGRAM(S): 2 INJECTION INTRAMUSCULAR; INTRAVENOUS; SUBCUTANEOUS at 12:33

## 2019-09-11 RX ADMIN — Medication 975 MILLIGRAM(S): at 15:30

## 2019-09-11 RX ADMIN — HYDROMORPHONE HYDROCHLORIDE 1 MILLIGRAM(S): 2 INJECTION INTRAMUSCULAR; INTRAVENOUS; SUBCUTANEOUS at 06:00

## 2019-09-11 RX ADMIN — HYDROMORPHONE HYDROCHLORIDE 0.5 MILLIGRAM(S): 2 INJECTION INTRAMUSCULAR; INTRAVENOUS; SUBCUTANEOUS at 16:26

## 2019-09-11 RX ADMIN — HYDROMORPHONE HYDROCHLORIDE 1 MILLIGRAM(S): 2 INJECTION INTRAMUSCULAR; INTRAVENOUS; SUBCUTANEOUS at 09:11

## 2019-09-11 RX ADMIN — HYDROMORPHONE HYDROCHLORIDE 1 MILLIGRAM(S): 2 INJECTION INTRAMUSCULAR; INTRAVENOUS; SUBCUTANEOUS at 03:05

## 2019-09-11 RX ADMIN — HYDROMORPHONE HYDROCHLORIDE 0.5 MILLIGRAM(S): 2 INJECTION INTRAMUSCULAR; INTRAVENOUS; SUBCUTANEOUS at 19:38

## 2019-09-11 RX ADMIN — HYDROMORPHONE HYDROCHLORIDE 1 MILLIGRAM(S): 2 INJECTION INTRAMUSCULAR; INTRAVENOUS; SUBCUTANEOUS at 09:30

## 2019-09-11 RX ADMIN — Medication 975 MILLIGRAM(S): at 14:27

## 2019-09-11 RX ADMIN — PIPERACILLIN AND TAZOBACTAM 25 GRAM(S): 4; .5 INJECTION, POWDER, LYOPHILIZED, FOR SOLUTION INTRAVENOUS at 06:22

## 2019-09-11 RX ADMIN — HYDROMORPHONE HYDROCHLORIDE 1 MILLIGRAM(S): 2 INJECTION INTRAMUSCULAR; INTRAVENOUS; SUBCUTANEOUS at 06:15

## 2019-09-11 RX ADMIN — PIPERACILLIN AND TAZOBACTAM 25 GRAM(S): 4; .5 INJECTION, POWDER, LYOPHILIZED, FOR SOLUTION INTRAVENOUS at 14:29

## 2019-09-11 RX ADMIN — HYDROMORPHONE HYDROCHLORIDE 1 MILLIGRAM(S): 2 INJECTION INTRAMUSCULAR; INTRAVENOUS; SUBCUTANEOUS at 02:50

## 2019-09-11 RX ADMIN — HYDROMORPHONE HYDROCHLORIDE 0.5 MILLIGRAM(S): 2 INJECTION INTRAMUSCULAR; INTRAVENOUS; SUBCUTANEOUS at 16:42

## 2019-09-11 RX ADMIN — Medication 1 PATCH: at 06:30

## 2019-09-11 RX ADMIN — Medication 1 PATCH: at 19:17

## 2019-09-11 NOTE — PROGRESS NOTE ADULT - ASSESSMENT
57 y/o with denies any PMHx presenting with LLQ pain x7 days, CT scan findings consistent with acute diverticulitis with intramural abscess.  Pain improving with antibioisis both subjectively and on exam.  Will need a rescan to evaluate interval change in abscess.        Diet: CLD  IVF: D5 0.45nl  IV abx: Zosyn  DVT ppx: Lovenox  Pain control: Tylenol and dilaudid as needed    Will plan to rescan tomorrow (9/12)    [facility]   [id number]

## 2019-09-12 LAB
ANION GAP SERPL CALC-SCNC: 14 MMO/L — SIGNIFICANT CHANGE UP (ref 7–14)
APTT BLD: 33.1 SEC — SIGNIFICANT CHANGE UP (ref 27.5–36.3)
BACTERIA BLD CULT: SIGNIFICANT CHANGE UP
BUN SERPL-MCNC: 4 MG/DL — LOW (ref 7–23)
CALCIUM SERPL-MCNC: 9.7 MG/DL — SIGNIFICANT CHANGE UP (ref 8.4–10.5)
CHLORIDE SERPL-SCNC: 99 MMOL/L — SIGNIFICANT CHANGE UP (ref 98–107)
CO2 SERPL-SCNC: 25 MMOL/L — SIGNIFICANT CHANGE UP (ref 22–31)
CREAT SERPL-MCNC: 0.64 MG/DL — SIGNIFICANT CHANGE UP (ref 0.5–1.3)
GLUCOSE SERPL-MCNC: 109 MG/DL — HIGH (ref 70–99)
HCT VFR BLD CALC: 40.2 % — SIGNIFICANT CHANGE UP (ref 34.5–45)
HGB BLD-MCNC: 13.2 G/DL — SIGNIFICANT CHANGE UP (ref 11.5–15.5)
INR BLD: 1.19 — HIGH (ref 0.88–1.17)
MAGNESIUM SERPL-MCNC: 2 MG/DL — SIGNIFICANT CHANGE UP (ref 1.6–2.6)
MCHC RBC-ENTMCNC: 30.4 PG — SIGNIFICANT CHANGE UP (ref 27–34)
MCHC RBC-ENTMCNC: 32.8 % — SIGNIFICANT CHANGE UP (ref 32–36)
MCV RBC AUTO: 92.6 FL — SIGNIFICANT CHANGE UP (ref 80–100)
NRBC # FLD: 0 K/UL — SIGNIFICANT CHANGE UP (ref 0–0)
PHOSPHATE SERPL-MCNC: 4.1 MG/DL — SIGNIFICANT CHANGE UP (ref 2.5–4.5)
PLATELET # BLD AUTO: 481 K/UL — HIGH (ref 150–400)
PMV BLD: 8.7 FL — SIGNIFICANT CHANGE UP (ref 7–13)
POTASSIUM SERPL-MCNC: 4.2 MMOL/L — SIGNIFICANT CHANGE UP (ref 3.5–5.3)
POTASSIUM SERPL-SCNC: 4.2 MMOL/L — SIGNIFICANT CHANGE UP (ref 3.5–5.3)
PROTHROM AB SERPL-ACNC: 13.3 SEC — HIGH (ref 9.8–13.1)
RBC # BLD: 4.34 M/UL — SIGNIFICANT CHANGE UP (ref 3.8–5.2)
RBC # FLD: 12.1 % — SIGNIFICANT CHANGE UP (ref 10.3–14.5)
SODIUM SERPL-SCNC: 138 MMOL/L — SIGNIFICANT CHANGE UP (ref 135–145)
WBC # BLD: 10.78 K/UL — HIGH (ref 3.8–10.5)
WBC # FLD AUTO: 10.78 K/UL — HIGH (ref 3.8–10.5)

## 2019-09-12 PROCEDURE — 99232 SBSQ HOSP IP/OBS MODERATE 35: CPT | Mod: GC

## 2019-09-12 PROCEDURE — 74177 CT ABD & PELVIS W/CONTRAST: CPT | Mod: 26

## 2019-09-12 RX ORDER — SODIUM CHLORIDE 9 MG/ML
1000 INJECTION, SOLUTION INTRAVENOUS
Refills: 0 | Status: DISCONTINUED | OUTPATIENT
Start: 2019-09-13 | End: 2019-09-13

## 2019-09-12 RX ADMIN — ENOXAPARIN SODIUM 40 MILLIGRAM(S): 100 INJECTION SUBCUTANEOUS at 12:46

## 2019-09-12 RX ADMIN — Medication 1 PATCH: at 20:23

## 2019-09-12 RX ADMIN — HYDROMORPHONE HYDROCHLORIDE 1 MILLIGRAM(S): 2 INJECTION INTRAMUSCULAR; INTRAVENOUS; SUBCUTANEOUS at 18:20

## 2019-09-12 RX ADMIN — HYDROMORPHONE HYDROCHLORIDE 0.5 MILLIGRAM(S): 2 INJECTION INTRAMUSCULAR; INTRAVENOUS; SUBCUTANEOUS at 05:47

## 2019-09-12 RX ADMIN — DEXTROSE MONOHYDRATE, SODIUM CHLORIDE, AND POTASSIUM CHLORIDE 50 MILLILITER(S): 50; .745; 4.5 INJECTION, SOLUTION INTRAVENOUS at 12:47

## 2019-09-12 RX ADMIN — Medication 1 PATCH: at 08:07

## 2019-09-12 RX ADMIN — HYDROMORPHONE HYDROCHLORIDE 1 MILLIGRAM(S): 2 INJECTION INTRAMUSCULAR; INTRAVENOUS; SUBCUTANEOUS at 14:00

## 2019-09-12 RX ADMIN — Medication 1 PATCH: at 12:46

## 2019-09-12 RX ADMIN — PIPERACILLIN AND TAZOBACTAM 25 GRAM(S): 4; .5 INJECTION, POWDER, LYOPHILIZED, FOR SOLUTION INTRAVENOUS at 21:18

## 2019-09-12 RX ADMIN — HYDROMORPHONE HYDROCHLORIDE 0.5 MILLIGRAM(S): 2 INJECTION INTRAMUSCULAR; INTRAVENOUS; SUBCUTANEOUS at 06:17

## 2019-09-12 RX ADMIN — HYDROMORPHONE HYDROCHLORIDE 1 MILLIGRAM(S): 2 INJECTION INTRAMUSCULAR; INTRAVENOUS; SUBCUTANEOUS at 10:13

## 2019-09-12 RX ADMIN — HYDROMORPHONE HYDROCHLORIDE 1 MILLIGRAM(S): 2 INJECTION INTRAMUSCULAR; INTRAVENOUS; SUBCUTANEOUS at 09:57

## 2019-09-12 RX ADMIN — HYDROMORPHONE HYDROCHLORIDE 1 MILLIGRAM(S): 2 INJECTION INTRAMUSCULAR; INTRAVENOUS; SUBCUTANEOUS at 18:06

## 2019-09-12 RX ADMIN — HYDROMORPHONE HYDROCHLORIDE 1 MILLIGRAM(S): 2 INJECTION INTRAMUSCULAR; INTRAVENOUS; SUBCUTANEOUS at 00:38

## 2019-09-12 RX ADMIN — HYDROMORPHONE HYDROCHLORIDE 1 MILLIGRAM(S): 2 INJECTION INTRAMUSCULAR; INTRAVENOUS; SUBCUTANEOUS at 21:18

## 2019-09-12 RX ADMIN — HYDROMORPHONE HYDROCHLORIDE 1 MILLIGRAM(S): 2 INJECTION INTRAMUSCULAR; INTRAVENOUS; SUBCUTANEOUS at 00:09

## 2019-09-12 RX ADMIN — PIPERACILLIN AND TAZOBACTAM 25 GRAM(S): 4; .5 INJECTION, POWDER, LYOPHILIZED, FOR SOLUTION INTRAVENOUS at 05:47

## 2019-09-12 RX ADMIN — PIPERACILLIN AND TAZOBACTAM 25 GRAM(S): 4; .5 INJECTION, POWDER, LYOPHILIZED, FOR SOLUTION INTRAVENOUS at 14:04

## 2019-09-12 RX ADMIN — HYDROMORPHONE HYDROCHLORIDE 1 MILLIGRAM(S): 2 INJECTION INTRAMUSCULAR; INTRAVENOUS; SUBCUTANEOUS at 14:10

## 2019-09-12 RX ADMIN — Medication 1 PATCH: at 12:45

## 2019-09-12 RX ADMIN — HYDROMORPHONE HYDROCHLORIDE 1 MILLIGRAM(S): 2 INJECTION INTRAMUSCULAR; INTRAVENOUS; SUBCUTANEOUS at 21:48

## 2019-09-12 NOTE — PROGRESS NOTE ADULT - ASSESSMENT
59 y/o with denies any PMHx presenting with LLQ pain x7 days, CT scan findings consistent with acute diverticulitis with intramural abscess. Pain improving with antibioisis both subjectively and on exam.  Will need a rescan to evaluate interval change in abscess.      Diet: CLD  IVF: continue IVF  IV abx: Zosyn  DVT ppx: Lovenox  Pain control: Tylenol and dilaudid as needed  Will plan to rescan today     [facility]   [id number]

## 2019-09-13 ENCOUNTER — TRANSCRIPTION ENCOUNTER (OUTPATIENT)
Age: 59
End: 2019-09-13

## 2019-09-13 VITALS
HEART RATE: 73 BPM | OXYGEN SATURATION: 98 % | SYSTOLIC BLOOD PRESSURE: 155 MMHG | RESPIRATION RATE: 19 BRPM | DIASTOLIC BLOOD PRESSURE: 89 MMHG | TEMPERATURE: 98 F

## 2019-09-13 LAB
ANION GAP SERPL CALC-SCNC: 14 MMO/L — SIGNIFICANT CHANGE UP (ref 7–14)
BUN SERPL-MCNC: 3 MG/DL — LOW (ref 7–23)
CALCIUM SERPL-MCNC: 9.5 MG/DL — SIGNIFICANT CHANGE UP (ref 8.4–10.5)
CHLORIDE SERPL-SCNC: 97 MMOL/L — LOW (ref 98–107)
CO2 SERPL-SCNC: 27 MMOL/L — SIGNIFICANT CHANGE UP (ref 22–31)
CREAT SERPL-MCNC: 0.71 MG/DL — SIGNIFICANT CHANGE UP (ref 0.5–1.3)
GLUCOSE SERPL-MCNC: 117 MG/DL — HIGH (ref 70–99)
HCT VFR BLD CALC: 40.2 % — SIGNIFICANT CHANGE UP (ref 34.5–45)
HGB BLD-MCNC: 13.3 G/DL — SIGNIFICANT CHANGE UP (ref 11.5–15.5)
MAGNESIUM SERPL-MCNC: 1.9 MG/DL — SIGNIFICANT CHANGE UP (ref 1.6–2.6)
MCHC RBC-ENTMCNC: 30.5 PG — SIGNIFICANT CHANGE UP (ref 27–34)
MCHC RBC-ENTMCNC: 33.1 % — SIGNIFICANT CHANGE UP (ref 32–36)
MCV RBC AUTO: 92.2 FL — SIGNIFICANT CHANGE UP (ref 80–100)
NRBC # FLD: 0 K/UL — SIGNIFICANT CHANGE UP (ref 0–0)
PHOSPHATE SERPL-MCNC: 4.8 MG/DL — HIGH (ref 2.5–4.5)
PLATELET # BLD AUTO: 532 K/UL — HIGH (ref 150–400)
PMV BLD: 8.8 FL — SIGNIFICANT CHANGE UP (ref 7–13)
POTASSIUM SERPL-MCNC: 4.2 MMOL/L — SIGNIFICANT CHANGE UP (ref 3.5–5.3)
POTASSIUM SERPL-SCNC: 4.2 MMOL/L — SIGNIFICANT CHANGE UP (ref 3.5–5.3)
RBC # BLD: 4.36 M/UL — SIGNIFICANT CHANGE UP (ref 3.8–5.2)
RBC # FLD: 12.3 % — SIGNIFICANT CHANGE UP (ref 10.3–14.5)
SODIUM SERPL-SCNC: 138 MMOL/L — SIGNIFICANT CHANGE UP (ref 135–145)
WBC # BLD: 9.37 K/UL — SIGNIFICANT CHANGE UP (ref 3.8–10.5)
WBC # FLD AUTO: 9.37 K/UL — SIGNIFICANT CHANGE UP (ref 3.8–10.5)

## 2019-09-13 PROCEDURE — 99238 HOSP IP/OBS DSCHRG MGMT 30/<: CPT

## 2019-09-13 RX ORDER — OXYCODONE HYDROCHLORIDE 5 MG/1
1 TABLET ORAL
Qty: 8 | Refills: 0
Start: 2019-09-13 | End: 2019-09-14

## 2019-09-13 RX ORDER — ACETAMINOPHEN 500 MG
3 TABLET ORAL
Qty: 0 | Refills: 0 | DISCHARGE
Start: 2019-09-13

## 2019-09-13 RX ORDER — OXYCODONE HYDROCHLORIDE 5 MG/1
5 TABLET ORAL ONCE
Refills: 0 | Status: DISCONTINUED | OUTPATIENT
Start: 2019-09-13 | End: 2019-09-13

## 2019-09-13 RX ADMIN — ENOXAPARIN SODIUM 40 MILLIGRAM(S): 100 INJECTION SUBCUTANEOUS at 12:17

## 2019-09-13 RX ADMIN — Medication 1 PATCH: at 10:16

## 2019-09-13 RX ADMIN — Medication 975 MILLIGRAM(S): at 10:13

## 2019-09-13 RX ADMIN — HYDROMORPHONE HYDROCHLORIDE 1 MILLIGRAM(S): 2 INJECTION INTRAMUSCULAR; INTRAVENOUS; SUBCUTANEOUS at 05:17

## 2019-09-13 RX ADMIN — HYDROMORPHONE HYDROCHLORIDE 1 MILLIGRAM(S): 2 INJECTION INTRAMUSCULAR; INTRAVENOUS; SUBCUTANEOUS at 01:52

## 2019-09-13 RX ADMIN — PIPERACILLIN AND TAZOBACTAM 25 GRAM(S): 4; .5 INJECTION, POWDER, LYOPHILIZED, FOR SOLUTION INTRAVENOUS at 05:18

## 2019-09-13 RX ADMIN — OXYCODONE HYDROCHLORIDE 5 MILLIGRAM(S): 5 TABLET ORAL at 12:13

## 2019-09-13 RX ADMIN — PIPERACILLIN AND TAZOBACTAM 25 GRAM(S): 4; .5 INJECTION, POWDER, LYOPHILIZED, FOR SOLUTION INTRAVENOUS at 13:30

## 2019-09-13 RX ADMIN — OXYCODONE HYDROCHLORIDE 5 MILLIGRAM(S): 5 TABLET ORAL at 12:45

## 2019-09-13 RX ADMIN — Medication 975 MILLIGRAM(S): at 10:35

## 2019-09-13 RX ADMIN — Medication 1 PATCH: at 12:16

## 2019-09-13 RX ADMIN — HYDROMORPHONE HYDROCHLORIDE 1 MILLIGRAM(S): 2 INJECTION INTRAMUSCULAR; INTRAVENOUS; SUBCUTANEOUS at 05:47

## 2019-09-13 RX ADMIN — HYDROMORPHONE HYDROCHLORIDE 1 MILLIGRAM(S): 2 INJECTION INTRAMUSCULAR; INTRAVENOUS; SUBCUTANEOUS at 02:22

## 2019-09-13 NOTE — PROGRESS NOTE ADULT - SUBJECTIVE AND OBJECTIVE BOX
Interval Events:    No acute events overnight    S: Patient doing well, denies fevers, chills, nausea, emesis, chest pain, SOB.  C/o of some breakthrough pain through tylenol and dilaudid when dilaudid dose is ending. Tolerating PO w/o N/V.  +/+ F/BM.    O: Vital Signs Last 24 Hrs  T(C): 37 (09 Sep 2019 05:07), Max: 37 (09 Sep 2019 05:07)  T(F): 98.6 (09 Sep 2019 05:07), Max: 98.6 (09 Sep 2019 05:07)  HR: 75 (09 Sep 2019 06:41) (70 - 87)  BP: 134/92 (09 Sep 2019 06:41) (130/81 - 150/82)  BP(mean): --  RR: 18 (09 Sep 2019 05:07) (17 - 19)  SpO2: 99% (09 Sep 2019 05:07) (97% - 99%)      07 Sep 2019 07:01  -  08 Sep 2019 07:00  --------------------------------------------------------  IN:    IV PiggyBack: 200 mL    lactated ringers.: 100 mL    sodium chloride 0.9%: 800 mL  Total IN: 1100 mL    OUT:    Voided: 850 mL  Total OUT: 850 mL    Total NET: 250 mL      08 Sep 2019 07:01  -  09 Sep 2019 06:54  --------------------------------------------------------  IN:    dextrose 5% + sodium chloride 0.9%: 1200 mL    IV PiggyBack: 300 mL  Total IN: 1500 mL    OUT:    Voided: 450 mL  Total OUT: 450 mL    Total NET: 1050 mL          Physical Exam:    Gen: Well-developed, well-nourished in no acute distress  Resp: Clear to auscultation bilaterally with no wheezes, rale, or rhonchi  CV: Regular rate and rhythm with no murmur, gallop, or rub  GI: Soft, non-distended with normoactive bowel sounds.  No masses.  Tender to palpation in LLQ  MSK: Moves all extremities equally  Skin: No rashes    Labs:                        13.9   19.00 )-----------( 422      ( 08 Sep 2019 07:33 )             42.5     08 Sep 2019 07:33    139    |  97     |  10     ----------------------------<  74     3.8     |  26     |  0.63     Ca    9.6        08 Sep 2019 07:33  Phos  3.1       08 Sep 2019 07:33  Mg     2.0       08 Sep 2019 07:33        CAPILLARY BLOOD GLUCOSE    Culture - Blood (collected 07 Sep 2019 11:28)  Source: BLOOD VENOUS  Preliminary Report (08 Sep 2019 11:28):    NO ORGANISMS ISOLATED    NO ORGANISMS ISOLATED AT 24 HOURS    Culture - Blood (collected 07 Sep 2019 11:28)  Source: BLOOD PERIPHERAL  Preliminary Report (08 Sep 2019 11:28):    NO ORGANISMS ISOLATED    NO ORGANISMS ISOLATED AT 24 HOURS          MEDICATIONS  (STANDING):  dextrose 5% + sodium chloride 0.45% with potassium chloride 20 mEq/L 1000 milliLiter(s) (100 mL/Hr) IV Continuous <Continuous>  enoxaparin Injectable 40 milliGRAM(s) SubCutaneous daily  influenza   Vaccine 0.5 milliLiter(s) IntraMuscular once  nicotine -  14 mG/24Hr(s) Patch 1 patch Transdermal daily  piperacillin/tazobactam IVPB.. 3.375 Gram(s) IV Intermittent every 8 hours    MEDICATIONS  (PRN):  HYDROmorphone  Injectable 0.5 milliGRAM(s) IV Push every 3 hours PRN Moderate Pain (4 - 6)  HYDROmorphone  Injectable 1 milliGRAM(s) IV Push every 3 hours PRN Severe Pain (7 - 10)
Still having pain. Distended tender in  lower abdomen. To continue NPO and antibiotics. Dheld
A TEAM SURGERY GENERAL SURGERY PROGRESS NOTE:    Interval Events:    No acute events overnight    S: Patient doing well. Minimal pain. Denies fevers, chills, nausea, emesis, chest pain, SOB.   +/+ F/BM.    O:    Vital Signs Last 24 Hrs  T(C): 36.8 (19 @ 05:45), Max: 36.8 (19 @ 10:16)  HR: 72 (19 @ 06:00) (64 - 89)  BP: 151/95 (19 @ 06:00) (123/82 - 151/95)  ABP: --  ABP(mean): --  RR: 16 (19 @ 05:45) (16 - 18)  SpO2: 98% (19 @ 05:45) (96% - 99%)  Wt(kg): --  CVP(mm Hg): --       @ 07:01  -   @ 07:00  --------------------------------------------------------  IN:    dextrose 5% + sodium chloride 0.45% with potassium chloride 20 mEq/L: 600 mL    IV PiggyBack: 100 mL    Oral Fluid: 180 mL  Total IN: 880 mL    OUT:  Total OUT: 0 mL    Total NET: 880 mL      Physical Exam:   Gen: NAD. Alert and cooperative.   Resp: No addition work of breathing.  Card: RRR. No peripheral edema or pallor.   Abd: Soft, ND. suprapubic tenderness. No rebound or guarding   Ext: WWP. No significant deformity.       LABS:    CBC ( @ 06:50)                              13.3                           9.37    )----------------(  532<H>     --    % Neutrophils, --    % Lymphocytes, ANC: --                                  40.2    CBC ( @ 06:45)                              13.2                           10.78<H>  )----------------(  481<H>     --    % Neutrophils, --    % Lymphocytes, ANC: --                                  40.2      BMP ( @ 06:50)             138     |  97<L>   |  3<L>  		Ca++ --      Ca 9.5                ---------------------------------( 117<H>		Mg 1.9                4.2     |  27      |  0.71  			Ph 4.8<H>  BMP ( @ 06:30)             138     |  99      |  4<L>  		Ca++ --      Ca 9.7                ---------------------------------( 109<H>		Mg 2.0                4.2     |  25      |  0.64  			Ph 4.1         Coags ( @ 06:45)  aPTT 33.1 / INR 1.19<H> / PT 13.3<H>      CAPILLARY BLOOD GLUCOSE    Culture - Blood (collected 07 Sep 2019 11:28)  Source: BLOOD VENOUS  Preliminary Report (09 Sep 2019 11:28):    NO ORGANISMS ISOLATED    NO ORGANISMS ISOLATED AT 48 HRS.    Culture - Blood (collected 07 Sep 2019 11:28)  Source: BLOOD PERIPHERAL  Preliminary Report (09 Sep 2019 11:28):    NO ORGANISMS ISOLATED    NO ORGANISMS ISOLATED AT 48 HRS.      Urinalysis Basic - ( 10 Sep 2019 00:05 )    Color: LIGHT YELLOW / Appearance: CLEAR / S.008 / pH: 7.0  Gluc: NEGATIVE / Ketone: NEGATIVE  / Bili: NEGATIVE / Urobili: NORMAL   Blood: NEGATIVE / Protein: NEGATIVE / Nitrite: NEGATIVE   Leuk Esterase: MODERATE / RBC: 6-10 / WBC 6-10   Sq Epi: FEW / Non Sq Epi: x / Bacteria: NEGATIVE        MEDICATIONS  (STANDING):  acetaminophen  IVPB .. 1000 milliGRAM(s) IV Intermittent once  dextrose 5% + sodium chloride 0.45% with potassium chloride 20 mEq/L 1000 milliLiter(s) (100 mL/Hr) IV Continuous <Continuous>  enoxaparin Injectable 40 milliGRAM(s) SubCutaneous daily  influenza   Vaccine 0.5 milliLiter(s) IntraMuscular once  nicotine -  14 mG/24Hr(s) Patch 1 patch Transdermal daily  piperacillin/tazobactam IVPB.. 3.375 Gram(s) IV Intermittent every 8 hours    MEDICATIONS  (PRN):  HYDROmorphone  Injectable 0.5 milliGRAM(s) IV Push every 3 hours PRN Moderate Pain (4 - 6)  HYDROmorphone  Injectable 1 milliGRAM(s) IV Push every 3 hours PRN Severe Pain (7 - 10)
Feels better, Exam negative. WBC down to 10. To repeat CT and likely advance diet. Dheld
Interval Events:    No acute events overnight    S: Patient doing well, denies fevers, chills, nausea, emesis, chest pain, SOB.  Pain is well controlled and improving per patient.  +/- F/BM.    O: Vital Signs Last 24 Hrs  T(C): 37 (10 Sep 2019 05:11), Max: 37 (10 Sep 2019 05:11)  T(F): 98.6 (10 Sep 2019 05:11), Max: 98.6 (10 Sep 2019 05:11)  HR: 80 (10 Sep 2019 08:29) (76 - 93)  BP: 158/98 (10 Sep 2019 08:29) (131/88 - 158/98)  BP(mean): --  RR: 20 (10 Sep 2019 05:11) (17 - 20)  SpO2: 96% (10 Sep 2019 05:11) (96% - 99%)      09 Sep 2019 07:01  -  10 Sep 2019 07:00  --------------------------------------------------------  IN:    dextrose 5% + sodium chloride 0.45% with potassium chloride 20 mEq/L: 2400 mL    IV PiggyBack: 200 mL  Total IN: 2600 mL    OUT:    Voided: 1220 mL  Total OUT: 1220 mL    Total NET: 1380 mL          Physical Exam:    Gen: Well-developed, well-nourished in no acute distress  Resp: Clear to auscultation bilaterally with no wheezes, rale, or rhonchi  CV: Regular rate and rhythm with no murmur, gallop, or rub  1.	GI: Soft, non-distended with normoactive bowel sounds.  No masses.  Mildly tender to palpation, over LLQ.  MSK: Moves all extremities equally  Skin: No rashes    Labs:                        13.0   15.30 )-----------( 433      ( 10 Sep 2019 06:05 )             39.2     10 Sep 2019 06:05    136    |  97     |  3      ----------------------------<  128    3.9     |  28     |  0.59     Ca    9.2        10 Sep 2019 06:05  Phos  2.8       10 Sep 2019 06:05  Mg     2.0       10 Sep 2019 06:05        CAPILLARY BLOOD GLUCOSE                Culture - Blood (collected 07 Sep 2019 11:28)  Source: BLOOD VENOUS  Preliminary Report (09 Sep 2019 11:28):    NO ORGANISMS ISOLATED    NO ORGANISMS ISOLATED AT 48 HRS.    Culture - Blood (collected 07 Sep 2019 11:28)  Source: BLOOD PERIPHERAL  Preliminary Report (09 Sep 2019 11:28):    NO ORGANISMS ISOLATED    NO ORGANISMS ISOLATED AT 48 HRS.      Urinalysis Basic - ( 10 Sep 2019 00:05 )    Color: LIGHT YELLOW / Appearance: CLEAR / S.008 / pH: 7.0  Gluc: NEGATIVE / Ketone: NEGATIVE  / Bili: NEGATIVE / Urobili: NORMAL   Blood: NEGATIVE / Protein: NEGATIVE / Nitrite: NEGATIVE   Leuk Esterase: MODERATE / RBC: 6-10 / WBC 6-10   Sq Epi: FEW / Non Sq Epi: x / Bacteria: NEGATIVE        MEDICATIONS  (STANDING):  acetaminophen  IVPB .. 1000 milliGRAM(s) IV Intermittent once  dextrose 5% + sodium chloride 0.45% with potassium chloride 20 mEq/L 1000 milliLiter(s) (100 mL/Hr) IV Continuous <Continuous>  enoxaparin Injectable 40 milliGRAM(s) SubCutaneous daily  influenza   Vaccine 0.5 milliLiter(s) IntraMuscular once  nicotine -  14 mG/24Hr(s) Patch 1 patch Transdermal daily  piperacillin/tazobactam IVPB.. 3.375 Gram(s) IV Intermittent every 8 hours    MEDICATIONS  (PRN):  HYDROmorphone  Injectable 0.5 milliGRAM(s) IV Push every 3 hours PRN Moderate Pain (4 - 6)  HYDROmorphone  Injectable 1 milliGRAM(s) IV Push every 3 hours PRN Severe Pain (7 - 10)
Interval Events:    No acute events overnight    S: Patient doing well, denies fevers, chills, nausea, emesis, chest pain, SOB.  Pain is worse than yesterday.  +/- F/BM.    O:  Physical Exam:   Gen: NAD. Alert and cooperative.   Resp: No addition work of breathing.  Card: RRR. No peripheral edema or pallor.   Abd: Soft, ND. Tender in along left abdomen. No rebound or guarding   Ext: WWP. No significant deformity.     Vital Signs Last 24 Hrs  T(C): 37 (10 Sep 2019 05:11), Max: 37 (10 Sep 2019 05:11)  T(F): 98.6 (10 Sep 2019 05:11), Max: 98.6 (10 Sep 2019 05:11)  HR: 80 (10 Sep 2019 08:29) (76 - 93)  BP: 158/98 (10 Sep 2019 08:29) (131/88 - 158/98)  BP(mean): --  RR: 20 (10 Sep 2019 05:11) (17 - 20)  SpO2: 96% (10 Sep 2019 05:11) (96% - 99%)      09 Sep 2019 07:01  -  10 Sep 2019 07:00  --------------------------------------------------------  IN:    dextrose 5% + sodium chloride 0.45% with potassium chloride 20 mEq/L: 2400 mL    IV PiggyBack: 200 mL  Total IN: 2600 mL    OUT:    Voided: 1220 mL  Total OUT: 1220 mL    Total NET: 1380 mL    Labs:             13.0   15.30 )-----------( 433      ( 10 Sep 2019 06:05 )             39.2     10 Sep 2019 06:05    136    |  97     |  3      ----------------------------<  128    3.9     |  28     |  0.59     Ca    9.2        10 Sep 2019 06:05  Phos  2.8       10 Sep 2019 06:05  Mg     2.0       10 Sep 2019 06:05        CAPILLARY BLOOD GLUCOSE                Culture - Blood (collected 07 Sep 2019 11:28)  Source: BLOOD VENOUS  Preliminary Report (09 Sep 2019 11:28):    NO ORGANISMS ISOLATED    NO ORGANISMS ISOLATED AT 48 HRS.    Culture - Blood (collected 07 Sep 2019 11:28)  Source: BLOOD PERIPHERAL  Preliminary Report (09 Sep 2019 11:28):    NO ORGANISMS ISOLATED    NO ORGANISMS ISOLATED AT 48 HRS.      Urinalysis Basic - ( 10 Sep 2019 00:05 )    Color: LIGHT YELLOW / Appearance: CLEAR / S.008 / pH: 7.0  Gluc: NEGATIVE / Ketone: NEGATIVE  / Bili: NEGATIVE / Urobili: NORMAL   Blood: NEGATIVE / Protein: NEGATIVE / Nitrite: NEGATIVE   Leuk Esterase: MODERATE / RBC: 6-10 / WBC 6-10   Sq Epi: FEW / Non Sq Epi: x / Bacteria: NEGATIVE        MEDICATIONS  (STANDING):  acetaminophen  IVPB .. 1000 milliGRAM(s) IV Intermittent once  dextrose 5% + sodium chloride 0.45% with potassium chloride 20 mEq/L 1000 milliLiter(s) (100 mL/Hr) IV Continuous <Continuous>  enoxaparin Injectable 40 milliGRAM(s) SubCutaneous daily  influenza   Vaccine 0.5 milliLiter(s) IntraMuscular once  nicotine -  14 mG/24Hr(s) Patch 1 patch Transdermal daily  piperacillin/tazobactam IVPB.. 3.375 Gram(s) IV Intermittent every 8 hours    MEDICATIONS  (PRN):  HYDROmorphone  Injectable 0.5 milliGRAM(s) IV Push every 3 hours PRN Moderate Pain (4 - 6)  HYDROmorphone  Injectable 1 milliGRAM(s) IV Push every 3 hours PRN Severe Pain (7 - 10)
Interval Events:    No acute events overnight    S: Patient doing well. Minimal pain. Denies fevers, chills, nausea, emesis, chest pain, SOB.   +/- F/BM.    O:  Physical Exam:   Gen: NAD. Alert and cooperative.   Resp: No addition work of breathing.  Card: RRR. No peripheral edema or pallor.   Abd: Soft, ND. suprapubic tenderness. No rebound or guarding   Ext: WWP. No significant deformity.     Vital Signs Last 24 Hrs  T(C): 36.8 (19 @ 05:45), Max: 36.8 (19 @ 10:16)  HR: 72 (19 @ 06:00) (64 - 89)  BP: 151/95 (19 @ 06:00) (123/82 - 151/95)  ABP: --  ABP(mean): --  RR: 16 (19 @ 05:45) (16 - 18)  SpO2: 98% (19 @ 05:45) (96% - 99%)  Wt(kg): --  CVP(mm Hg): --       @ 07:01  -   @ 07:00  --------------------------------------------------------  IN:    dextrose 5% + sodium chloride 0.45% with potassium chloride 20 mEq/L: 600 mL    IV PiggyBack: 100 mL    Oral Fluid: 180 mL  Total IN: 880 mL    OUT:  Total OUT: 0 mL    Total NET: 880 mL        CAPILLARY BLOOD GLUCOSE    Culture - Blood (collected 07 Sep 2019 11:28)  Source: BLOOD VENOUS  Preliminary Report (09 Sep 2019 11:28):    NO ORGANISMS ISOLATED    NO ORGANISMS ISOLATED AT 48 HRS.    Culture - Blood (collected 07 Sep 2019 11:28)  Source: BLOOD PERIPHERAL  Preliminary Report (09 Sep 2019 11:28):    NO ORGANISMS ISOLATED    NO ORGANISMS ISOLATED AT 48 HRS.      Urinalysis Basic - ( 10 Sep 2019 00:05 )    Color: LIGHT YELLOW / Appearance: CLEAR / S.008 / pH: 7.0  Gluc: NEGATIVE / Ketone: NEGATIVE  / Bili: NEGATIVE / Urobili: NORMAL   Blood: NEGATIVE / Protein: NEGATIVE / Nitrite: NEGATIVE   Leuk Esterase: MODERATE / RBC: 6-10 / WBC 6-10   Sq Epi: FEW / Non Sq Epi: x / Bacteria: NEGATIVE        MEDICATIONS  (STANDING):  acetaminophen  IVPB .. 1000 milliGRAM(s) IV Intermittent once  dextrose 5% + sodium chloride 0.45% with potassium chloride 20 mEq/L 1000 milliLiter(s) (100 mL/Hr) IV Continuous <Continuous>  enoxaparin Injectable 40 milliGRAM(s) SubCutaneous daily  influenza   Vaccine 0.5 milliLiter(s) IntraMuscular once  nicotine -  14 mG/24Hr(s) Patch 1 patch Transdermal daily  piperacillin/tazobactam IVPB.. 3.375 Gram(s) IV Intermittent every 8 hours    MEDICATIONS  (PRN):  HYDROmorphone  Injectable 0.5 milliGRAM(s) IV Push every 3 hours PRN Moderate Pain (4 - 6)  HYDROmorphone  Injectable 1 milliGRAM(s) IV Push every 3 hours PRN Severe Pain (7 - 10)
VSS, WBC 15,000, Still has lower abdominal pain. To repeat CT 9/12. Claudette
GENERAL SURGERY DAILY PROGRESS NOTE:    Interval: No acute events overnight.    S: Patient seen and examined. Reports pain comes in waves. Denies fever, chills, SOB. - N/V.  +/+ F/BM. +OOB.    O:   Exam:  Gen: NAD. Well developed, alert and cooperative.   Resp: No addition work of breathing.   Card: RR. No peripheral edema or pallor.   Abd: No masses. Soft, ND.  No rebound or guarding. Diffusely tender.   Ext: WWP. Able to move all extremities equally.  Neuro: AA&Ox3. No focal defects.    Vital Signs Last 24 Hrs  T(C): 36.6 (08 Sep 2019 09:44), Max: 37.1 (08 Sep 2019 02:25)  T(F): 97.9 (08 Sep 2019 09:44), Max: 98.7 (08 Sep 2019 02:25)  HR: 78 (08 Sep 2019 09:44) (70 - 95)  BP: 135/95 (08 Sep 2019 09:44) (128/85 - 147/91)  BP(mean): --  RR: 18 (08 Sep 2019 09:44) (16 - 19)  SpO2: 97% (08 Sep 2019 09:44) (95% - 98%)    I&O's Detail    07 Sep 2019 07:01  -  08 Sep 2019 07:00  --------------------------------------------------------  IN:    IV PiggyBack: 200 mL    lactated ringers.: 100 mL    sodium chloride 0.9%: 800 mL  Total IN: 1100 mL    OUT:    Voided: 850 mL  Total OUT: 850 mL    Total NET: 250 mL          Daily     Daily     MEDICATIONS  (STANDING):  dextrose 5% + sodium chloride 0.9%. 1000 milliLiter(s) (100 mL/Hr) IV Continuous <Continuous>  enoxaparin Injectable 40 milliGRAM(s) SubCutaneous daily  influenza   Vaccine 0.5 milliLiter(s) IntraMuscular once  nicotine -  14 mG/24Hr(s) Patch 1 patch Transdermal daily  piperacillin/tazobactam IVPB.. 3.375 Gram(s) IV Intermittent every 8 hours    MEDICATIONS  (PRN):  HYDROmorphone  Injectable 0.5 milliGRAM(s) IV Push every 4 hours PRN Moderate Pain (4 - 6)  HYDROmorphone  Injectable 1 milliGRAM(s) IV Push every 6 hours PRN Severe Pain (7 - 10)      LABS:                        13.9   19.00 )-----------( 422      ( 08 Sep 2019 07:33 )             42.5     09-08    139  |  97<L>  |  10  ----------------------------<  74  3.8   |  26  |  0.63    Ca    9.6      08 Sep 2019 07:33  Phos  3.1     09-08  Mg     2.0     09-08    TPro  8.5<H>  /  Alb  4.6  /  TBili  0.6  /  DBili  x   /  AST  20  /  ALT  37<H>  /  AlkPhos  164<H>  09-07

## 2019-09-13 NOTE — DISCHARGE NOTE NURSING/CASE MANAGEMENT/SOCIAL WORK - NSDCPNINST_GEN_ALL_CORE
If you experience any nausea, vomiting or diarrhea, a temperature of 100.4 or higher- chills or sweating, redness, bleeding, pain that is not controlled with medication prescribed by MD- report symptoms to the MD. Continue your low fiber diet and drink 8 glasses of water daily to promote hydration. If you experience any nausea, vomiting or diarrhea, a temperature of 100.4 or higher- chills or sweating, pain that is not controlled with medication prescribed by MD- report symptoms to the MD. No driving while taking pain medication, it causes drowsiness & constipation. Continue your low fiber diet and drink 8 glasses of water daily to promote hydration. No heavy lifting, pulling or pushing heavy objects. Schedule follow up apt. with MD within a week.

## 2019-09-13 NOTE — PROGRESS NOTE ADULT - ASSESSMENT
59 y/o with denies any PMHx presenting with LLQ pain x7 days, CT scan findings consistent with acute diverticulitis with intramural abscess. Pain improving with antibioisis both subjectively and on exam.  Will need a rescan to evaluate interval change in abscess.      Diet: advance as tolerated   d/c IVF  IV abx: Zosyn  DVT ppx: Lovenox  Pain control: Tylenol and dilaudid as needed  if tolerates diet will d/c home later today    [facility] Surgery   [id number]

## 2019-09-13 NOTE — DISCHARGE NOTE PROVIDER - NSDCCPCAREPLAN_GEN_ALL_CORE_FT
PRINCIPAL DISCHARGE DIAGNOSIS  Diagnosis: Diverticulitis of large intestine with abscess  Assessment and Plan of Treatment: DIET: Low fiber diet   NOTIFY YOUR SURGEON IF: You have increased pain, any fever (over 100.4 F) persistent nausea/vomiting, or if your pain is not controlled on your discharge pain medications.  Please follow up with your primary care physician in 1-2 weeks regarding your hospitalization.  Please follow up with your surgeon,  in 1-2 weeks. Please call office to make an appoitment.

## 2019-09-13 NOTE — DISCHARGE NOTE NURSING/CASE MANAGEMENT/SOCIAL WORK - PATIENT PORTAL LINK FT
You can access the FollowMyHealth Patient Portal offered by [facility] by registering at the following website: [email or web address]. By joining [employer]’s FollowMyHealth portal, you will also be able to view your health information using other applications (apps) compatible with our system.

## 2019-09-13 NOTE — DISCHARGE NOTE PROVIDER - CARE PROVIDER_API CALL
Vel Del Castillo)  ColonRectal Surgery; Surgery  [address] [place] [address]  Phone: [phone]  Fax: [phone]  Follow Up Time: 1 week

## 2019-09-13 NOTE — DISCHARGE NOTE PROVIDER - HOSPITAL COURSE
59 y/o who denies any pmhx coming in c/o of abdominal pain that started 7 days ago. Pain localized to LLQ. Patient reports that pain has been worsening in the last few days. Denies any episodes of vomiting, however, reports some nausea. Last PO intake was 2 days ago. Denies any previous similar episodes in the past. Patient has never had a colonoscopy. Denies fevers, chills, chest pain, SOB, changes in urinary habits, diarrhea, or constipation.         CT scan of abdomen and pelvis showed Complicated acute sigmoid colonic diverticulitis with intramural abscess measuring 5.6 x 4.0 cm. Right hepatic lesion, possibly hemangioma. Comparison with prior studies or ultrasound/MRI would be useful for further evaluation.        Pt was made NPO and started on IV abx. During hospital course patients diet was slowly advanced as tolerated. PN 9/12 pt had repeat CT scan which showed                  At this time, pt is tolerating a regular diet, ambulating and voiding.  Pt has been deemed stable for discharge at this time by attending.

## 2021-12-07 ENCOUNTER — EMERGENCY (EMERGENCY)
Facility: HOSPITAL | Age: 61
LOS: 1 days | Discharge: HOME | End: 2021-12-07
Attending: EMERGENCY MEDICINE
Payer: COMMERCIAL

## 2021-12-07 VITALS
OXYGEN SATURATION: 100 % | TEMPERATURE: 99 F | HEART RATE: 79 BPM | RESPIRATION RATE: 16 BRPM | DIASTOLIC BLOOD PRESSURE: 94 MMHG | SYSTOLIC BLOOD PRESSURE: 165 MMHG

## 2021-12-07 VITALS
TEMPERATURE: 98 F | OXYGEN SATURATION: 98 % | DIASTOLIC BLOOD PRESSURE: 90 MMHG | HEIGHT: 60 IN | HEART RATE: 88 BPM | WEIGHT: 130.07 LBS | SYSTOLIC BLOOD PRESSURE: 154 MMHG | RESPIRATION RATE: 19 BRPM

## 2021-12-07 DIAGNOSIS — Z98.890 OTHER SPECIFIED POSTPROCEDURAL STATES: Chronic | ICD-10-CM

## 2021-12-07 PROCEDURE — 10061 I&D ABSCESS COMP/MULTIPLE: CPT

## 2021-12-07 PROCEDURE — 99284 EMERGENCY DEPT VISIT MOD MDM: CPT | Mod: 25

## 2021-12-07 PROCEDURE — 99283 EMERGENCY DEPT VISIT LOW MDM: CPT | Mod: 25

## 2021-12-07 PROCEDURE — 87070 CULTURE OTHR SPECIMN AEROBIC: CPT

## 2021-12-07 RX ORDER — KETOROLAC TROMETHAMINE 30 MG/ML
1 SYRINGE (ML) INJECTION
Qty: 20 | Refills: 0
Start: 2021-12-07 | End: 2021-12-11

## 2021-12-07 RX ORDER — OXYCODONE HYDROCHLORIDE 5 MG/1
5 TABLET ORAL ONCE
Refills: 0 | Status: DISCONTINUED | OUTPATIENT
Start: 2021-12-07 | End: 2021-12-07

## 2021-12-07 RX ADMIN — Medication 450 MILLIGRAM(S): at 12:24

## 2021-12-07 RX ADMIN — OXYCODONE HYDROCHLORIDE 5 MILLIGRAM(S): 5 TABLET ORAL at 12:14

## 2021-12-07 NOTE — ED PROVIDER NOTE - PATIENT PORTAL LINK FT
You can access the FollowMyHealth Patient Portal offered by [facility] by registering at the following website: [email or web address]. By joining [employer]’s FollowMyHealth portal, you will also be able to view your health information using other applications (apps) compatible with our system.

## 2021-12-07 NOTE — ED POST DISCHARGE NOTE - REASON FOR FOLLOW-UP
Patient contacted, feeling better, pain ok, will return Thursday morning for wound check.  Instructed to continue antibiotics. Other

## 2021-12-07 NOTE — ED PROVIDER NOTE - PHYSICAL EXAMINATION
General: non-toxic, NAD  HEENT: NCAT, PERRL  Cardiac: RRR, no murmurs, 2+ peripheral pulses  Resp: CTAB  Abdomen: soft, non-distended, bowel sounds present, no ttp, no rebound or guarding. no organomegaly  Extremities: no peripheral edema, calf tenderness, or leg size discrepancies  Skin: large anterior neck abscess with pustule and surrounding induration.   Neuro: AAOx4, 5+motor, sensation grossly intact  Psych: mood and affect appropriate

## 2021-12-07 NOTE — ED PROVIDER NOTE - CLINICAL SUMMARY MEDICAL DECISION MAKING FREE TEXT BOX
pt with no relevant medical hx presents with acutely enlarged anterior neck abscess. will I&D and give pain control and advise to return for wound check as well as any new or concerning signs of systemic infection pt with no relevant medical hx presents with acutely enlarged anterior neck abscess. will I&D and give pain control and advise to return for wound check as well as any new or concerning signs of systemic infection    Attending MD Lopez: 62 yo female with presents with worsening of a anterior neck cyst (presents for years). Now painful, red, swollen.  No fevers.  On exam there is an abscess likely arising from an anterior neck sebaceous cyst.    S/p I&D of abscess which contained pus and a large amount of sebaceous material.  Culture sent.  Wound check in 2 days.

## 2021-12-07 NOTE — ED ADULT NURSE NOTE - OBJECTIVE STATEMENT
Telephone Encounter by Ed Arreguin MD at 07/18/17 04:43 PM     Author:  Ed Arreguin MD Service:  (none) Author Type:  Physician     Filed:  07/18/17 04:44 PM Encounter Date:  7/18/2017 Status:  Signed     :  Ed Arreguin MD (Physician)            Just started med today so before giving 90 day rx want to be sure everything ok[AB1.1M]      Revision History        User Key Date/Time User Provider Type Action    > AB1.1 07/18/17 04:44 PM Ed Arreguin MD Physician Sign    M - Manual            
Telephone Encounter by Tonya Munoz MA at 07/18/17 01:20 PM     Author:  Tonya Munoz MA Service:  (none) Author Type:  Medical Assistant     Filed:  07/18/17 04:21 PM Encounter Date:  7/18/2017 Status:  Addendum     :  Tonya Munoz MA (Medical Assistant)            Patient asking for 90 day supply, Dr. Arreguin please advise   Patient was just seen and started this medication on 7/18/2017[KW1.1M]      Revision History        User Key Date/Time User Provider Type Action    > KW1.1 07/18/17 04:21 PM Tonya Munoz MA Medical Assistant Addend     [N/A] 07/18/17 01:21 PM Tonya Munoz MA Medical Assistant Sign    M - Manual            
Pt 62 y/o female, AxOx3, presets to ED from home complaining of chest/neck abases x 2 days. Pt states she noticed swelling/ pain  days ago, seen by PCP yesterday and started on PO abx. Pt is well appearing, speaking full sentences without difficulty. Breathing spontaneous and unlabored. Swelling and large abscess noted to chest/ neck area w/ puss drainage. Denies difficulty breathing, drooling or fevers.

## 2021-12-07 NOTE — ED PROVIDER NOTE - NS ED ROS FT
Constitutional: no fevers, chills  HEENT: no HA, vision changes, rhinorrhea, sore throat  Cardiac: no chest pain, palpitations  Respiratory: no SOB, cough or hemoptysis  GI: no n/v/d/c, abd pain, bloody or dark stools  : no dysuria, frequency, or hematuria  MSK: no joint pain, neck pain or back pain  Skin: +anterior neck abscess   Neuro: no numbness/tingling, weakness, unsteady gait  Psych: depression or suicidal thoughts

## 2021-12-07 NOTE — ED PROVIDER NOTE - NSFOLLOWUPINSTRUCTIONS_ED_ALL_ED_FT
You were seen in the Emergency Department for neck abscess.     1) Advance activity as tolerated.   2) Continue all previously prescribed medications as directed.    3) Follow up with your primary care physician in 24-48 hours - take copies of your results.    4) Return to the Emergency Department for worsening or persistent symptoms, and/or ANY NEW OR CONCERNING SYMPTOMS including but limited to fever, chills, muscle aches, difficulty breathing, coughing up blood, swelling of the airway or any other new or concerning symptom

## 2021-12-07 NOTE — ED PROVIDER NOTE - OBJECTIVE STATEMENT
pt with chronic ant neck abscess xYears presents with acute enlargement and increasing pain over the last 2d. No associated fever, chills, n/v/d, difficulty breathing or recent abx use. Saw PCP given levaquin without relief.

## 2021-12-07 NOTE — ED ADULT NURSE NOTE - PAIN: BODY LOCATION
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 We reviewed goals of palliative chemotherapy. She has completed palliative RT to the abdominal wall. We reviewed continued support for symptom control. We reviewed abdominal pain: continuation of hydromorphone and methadone. We reviewed topotecan. We reviewed D1,8,15 every 28 days.  Emphasized she may have extensive myelosuppression which may require Day 8 or Day 15 Hold.  Will RX Levaquin for URI .  Encouraged follow up for fevers.  We reviewed potential side effects including but not limited to: fatigue, low blood counts, infection risk, nausea/ vomiting, decreased appetite, and arthralgias. We reviewed supportive care options and explained treatment will be modified or stopped for any intolerable side effect. Questions answered to their satisfaction. Th\par Wt loss: reviewed continued follow up with nutritionist and adjusted marinol to 1/2 hour before lunch or dinner. Metoclopramide 10mg three times a day prior to meals to decrease nausea and improve GI motility. \par \par Constipation: continue with bowel regimen\par \par Decreased performance status: reviewed with her wife her increasing weakness and expectation that this will get worse over time due to her disease. We reviewed getting wheelchair to help with ease of caregiving and moving patient in the home.  Referral submitted. \par \par Follow up 3 weeks or PRN sooner as indicated. neck/ chest

## 2021-12-07 NOTE — ED PROVIDER NOTE - ATTENDING CONTRIBUTION TO CARE
Attending MD Lopez:  I personally have seen and examined this patient.  Resident note reviewed and agree on plan of care and except where noted.

## 2021-12-09 ENCOUNTER — EMERGENCY (EMERGENCY)
Facility: HOSPITAL | Age: 61
LOS: 1 days | Discharge: HOME | End: 2021-12-09
Attending: EMERGENCY MEDICINE
Payer: COMMERCIAL

## 2021-12-09 VITALS
SYSTOLIC BLOOD PRESSURE: 141 MMHG | HEART RATE: 82 BPM | TEMPERATURE: 98 F | RESPIRATION RATE: 18 BRPM | OXYGEN SATURATION: 95 % | DIASTOLIC BLOOD PRESSURE: 94 MMHG

## 2021-12-09 VITALS
TEMPERATURE: 98 F | OXYGEN SATURATION: 97 % | HEIGHT: 60 IN | WEIGHT: 130.07 LBS | RESPIRATION RATE: 18 BRPM | DIASTOLIC BLOOD PRESSURE: 104 MMHG | SYSTOLIC BLOOD PRESSURE: 155 MMHG | HEART RATE: 76 BPM

## 2021-12-09 DIAGNOSIS — Z98.890 OTHER SPECIFIED POSTPROCEDURAL STATES: Chronic | ICD-10-CM

## 2021-12-09 LAB
CULTURE RESULTS: NO GROWTH — SIGNIFICANT CHANGE UP
SPECIMEN SOURCE: SIGNIFICANT CHANGE UP

## 2021-12-09 PROCEDURE — 99282 EMERGENCY DEPT VISIT SF MDM: CPT

## 2021-12-09 NOTE — ED PROVIDER NOTE - PATIENT PORTAL LINK FT
You can access the FollowMyHealth Patient Portal offered by [facility] by registering at the following website: [email or web address]. By joining [email or web address] FollowMyHealth portal, you will also be able to view your health information using other applications (apps) compatible with our system.

## 2021-12-09 NOTE — ED PROVIDER NOTE - PROGRESS NOTE DETAILS
drained small amount of pus out of incision site, irrigated with some saline, redressed wound. patient well, will d/c with f/u again in 2 days

## 2021-12-09 NOTE — ED PROVIDER NOTE - NSFOLLOWUPINSTRUCTIONS_ED_ALL_ED_FT
You were seen in the ED for a neck abscess. Your wound appears to be healing well.    Please continue to take your antibiotics and return to the ED in 2 days for one last check-up appointment.    Return to the ED sooner if you develop difficulty breathing, difficulty swallowing, increasing pain and swelling, fevers, or any other new or worsening symptoms.

## 2021-12-09 NOTE — ED ADULT NURSE NOTE - OBJECTIVE STATEMENT
1030 61 yr old WF here for wound check. s/p I&D of wound abscess done in ER 2 days ago. denies fever or chills. Packing intact. states has been draining blood and pus. Slight erythema and swelling visible at site. A&Ox4. ambulatory

## 2021-12-09 NOTE — ED PROVIDER NOTE - PHYSICAL EXAMINATION
GENERAL: no acute distress, non-toxic appearing  HEAD: normocephalic, atraumatic  HEENT: PERRLA, EOMI, normal conjunctiva, oral mucosa moist, midline neck abscess, small incision site still open, draining minimal pus and blood, no additional fluctuant pockets felt on exam, abscess still erythematous, mild pain to palpation  CARDIAC: regular rate and rhythm, normal S1 and S2, no appreciable murmurs  PULM: clear to ascultation bilaterally, mild rhonchi, no crackles, wheezing  NEURO: alert and oriented x 3, normal speech, no gross neurologic deficit  SKIN: no visible rashes, dry, well-perfused  PSYCH: appropriate mood and affect

## 2021-12-09 NOTE — ED PROVIDER NOTE - CLINICAL SUMMARY MEDICAL DECISION MAKING FREE TEXT BOX
Patient is a 61y F PMHx midline neck abscess I&D in ED 2 days ago, returning for follow-up. patient currently on antibiotics. denies fevers. Will check wound, redress, like d/c with f/u one more time.

## 2021-12-09 NOTE — ED ADULT TRIAGE NOTE - AS TEMP SITE
Patient c/o dizziness, pain in neck radiating into shoulders. Pain in right jaw. Diagnosed with [facility] on 4/26/18 at [facility]. Placed on Amoxicillin which patient started on 4/28/18 along with meclizine.
oral

## 2021-12-09 NOTE — ED PROVIDER NOTE - OBJECTIVE STATEMENT
Patient is a 61y F PMHx midline neck abscess I&D in ED 2 days ago, returning for follow-up. patient currently on antibiotics. Denies fevers, n/v, difficulty swallowing or breathing. Patient is a 61y F PMHx midline neck abscess I&D in ED 2 days ago, returning for follow-up. patient currently on antibiotics. Denies fevers, n/v, difficulty swallowing or breathing.    Attendinyo female presents for wound check.  pt is s/p I&D of abscess to neck about 2 days ago.  states feeling better, no drainage, no fever.  taking clindamycin

## 2025-02-19 NOTE — ED ADULT NURSE NOTE - CAS TRG GENERAL AIRWAY, MLM
Called Makenna in return from her call. We touched based on the VM that Dr Cohen left her. Right DX mammogram, right US and potential biopsy ordered. Our scheduling department will reach out to her to make this appointment/s. Magseed placement and surg on hold for now until more info gained.  
Patent